# Patient Record
Sex: FEMALE | Race: OTHER | Employment: PART TIME | URBAN - METROPOLITAN AREA
[De-identification: names, ages, dates, MRNs, and addresses within clinical notes are randomized per-mention and may not be internally consistent; named-entity substitution may affect disease eponyms.]

---

## 2017-02-28 ENCOUNTER — OFFICE VISIT (OUTPATIENT)
Dept: INTERNAL MEDICINE CLINIC | Age: 32
End: 2017-02-28

## 2017-02-28 VITALS
TEMPERATURE: 99.4 F | DIASTOLIC BLOOD PRESSURE: 76 MMHG | HEIGHT: 63 IN | SYSTOLIC BLOOD PRESSURE: 124 MMHG | BODY MASS INDEX: 26.58 KG/M2 | RESPIRATION RATE: 18 BRPM | WEIGHT: 150 LBS | HEART RATE: 80 BPM

## 2017-02-28 DIAGNOSIS — J20.9 ACUTE BRONCHITIS, UNSPECIFIED ORGANISM: ICD-10-CM

## 2017-02-28 DIAGNOSIS — J01.90 ACUTE SINUSITIS, RECURRENCE NOT SPECIFIED, UNSPECIFIED LOCATION: Primary | ICD-10-CM

## 2017-02-28 DIAGNOSIS — J45.20 RAD (REACTIVE AIRWAY DISEASE), MILD INTERMITTENT, UNCOMPLICATED: ICD-10-CM

## 2017-02-28 PROCEDURE — 99213 OFFICE O/P EST LOW 20 MIN: CPT | Performed by: FAMILY MEDICINE

## 2017-02-28 RX ORDER — AZITHROMYCIN 250 MG/1
TABLET, FILM COATED ORAL
Qty: 1 PACKET | Refills: 0 | Status: SHIPPED | OUTPATIENT
Start: 2017-02-28 | End: 2017-03-10

## 2017-02-28 RX ORDER — PROMETHAZINE HYDROCHLORIDE AND CODEINE PHOSPHATE 6.25; 1 MG/5ML; MG/5ML
5 SYRUP ORAL 4 TIMES DAILY PRN
Qty: 140 ML | Refills: 0 | Status: SHIPPED | OUTPATIENT
Start: 2017-02-28 | End: 2017-03-07

## 2017-02-28 RX ORDER — PREDNISONE 20 MG/1
20 TABLET ORAL
Qty: 9 TABLET | Refills: 0 | Status: SHIPPED | OUTPATIENT
Start: 2017-02-28 | End: 2017-03-03

## 2017-02-28 ASSESSMENT — ENCOUNTER SYMPTOMS
COUGH: 1
DIARRHEA: 0
SORE THROAT: 0
WHEEZING: 1
EYE DISCHARGE: 0
VOMITING: 0
RHINORRHEA: 0
SINUS PRESSURE: 1
EYE REDNESS: 0
ABDOMINAL PAIN: 0
SHORTNESS OF BREATH: 0

## 2017-03-14 ENCOUNTER — TELEPHONE (OUTPATIENT)
Dept: INTERNAL MEDICINE CLINIC | Age: 32
End: 2017-03-14

## 2017-03-14 DIAGNOSIS — R30.0 DYSURIA: Primary | ICD-10-CM

## 2017-03-14 RX ORDER — CIPROFLOXACIN 500 MG/1
500 TABLET, FILM COATED ORAL 2 TIMES DAILY
Qty: 20 TABLET | Refills: 0 | Status: SHIPPED | OUTPATIENT
Start: 2017-03-14 | End: 2017-03-24

## 2017-03-15 ENCOUNTER — OFFICE VISIT (OUTPATIENT)
Dept: INTERNAL MEDICINE CLINIC | Age: 32
End: 2017-03-15

## 2017-03-15 VITALS
WEIGHT: 151 LBS | DIASTOLIC BLOOD PRESSURE: 68 MMHG | HEIGHT: 64 IN | SYSTOLIC BLOOD PRESSURE: 106 MMHG | HEART RATE: 96 BPM | TEMPERATURE: 98.4 F | BODY MASS INDEX: 25.78 KG/M2

## 2017-03-15 DIAGNOSIS — R11.0 NAUSEA: ICD-10-CM

## 2017-03-15 DIAGNOSIS — B02.9 HERPES ZOSTER WITHOUT COMPLICATION: ICD-10-CM

## 2017-03-15 DIAGNOSIS — B37.9 ANTIBIOTIC-INDUCED YEAST INFECTION: ICD-10-CM

## 2017-03-15 DIAGNOSIS — R30.9 PAINFUL URINATION: Primary | ICD-10-CM

## 2017-03-15 DIAGNOSIS — T36.95XA ANTIBIOTIC-INDUCED YEAST INFECTION: ICD-10-CM

## 2017-03-15 DIAGNOSIS — N39.0 URINARY TRACT INFECTION WITHOUT HEMATURIA, SITE UNSPECIFIED: ICD-10-CM

## 2017-03-15 DIAGNOSIS — R23.8 VESICULAR RASH: ICD-10-CM

## 2017-03-15 LAB
BILIRUBIN, POC: NORMAL
BLOOD URINE, POC: NORMAL
CLARITY, POC: CLEAR
COLOR, POC: YELLOW
GLUCOSE URINE, POC: NORMAL
KETONES, POC: NORMAL
LEUKOCYTE EST, POC: NORMAL
NITRITE, POC: NORMAL
PH, POC: 9
PROTEIN, POC: 30
SPECIFIC GRAVITY, POC: 1.01
UROBILINOGEN, POC: NORMAL

## 2017-03-15 PROCEDURE — 81002 URINALYSIS NONAUTO W/O SCOPE: CPT | Performed by: FAMILY MEDICINE

## 2017-03-15 PROCEDURE — 96372 THER/PROPH/DIAG INJ SC/IM: CPT | Performed by: FAMILY MEDICINE

## 2017-03-15 PROCEDURE — 99213 OFFICE O/P EST LOW 20 MIN: CPT | Performed by: FAMILY MEDICINE

## 2017-03-15 RX ORDER — FLUCONAZOLE 150 MG/1
150 TABLET ORAL ONCE
Qty: 1 TABLET | Refills: 0 | Status: SHIPPED | OUTPATIENT
Start: 2017-03-15 | End: 2017-03-15

## 2017-03-15 RX ORDER — CEFTRIAXONE 1 G/1
1 INJECTION, POWDER, FOR SOLUTION INTRAMUSCULAR; INTRAVENOUS ONCE
Status: COMPLETED | OUTPATIENT
Start: 2017-03-15 | End: 2017-03-15

## 2017-03-15 RX ORDER — VALACYCLOVIR HYDROCHLORIDE 1 G/1
1000 TABLET, FILM COATED ORAL 3 TIMES DAILY
Qty: 21 TABLET | Refills: 0 | Status: SHIPPED | OUTPATIENT
Start: 2017-03-15 | End: 2020-07-10

## 2017-03-15 RX ADMIN — CEFTRIAXONE 1 G: 1 INJECTION, POWDER, FOR SOLUTION INTRAMUSCULAR; INTRAVENOUS at 15:43

## 2017-03-15 ASSESSMENT — ENCOUNTER SYMPTOMS
WHEEZING: 0
COUGH: 0
SHORTNESS OF BREATH: 0
ABDOMINAL PAIN: 0
SORE THROAT: 0
DIARRHEA: 0
NAUSEA: 1
VOMITING: 0

## 2017-03-27 ENCOUNTER — TELEPHONE (OUTPATIENT)
Dept: INTERNAL MEDICINE CLINIC | Age: 32
End: 2017-03-27

## 2018-10-18 ENCOUNTER — APPOINTMENT (OUTPATIENT)
Dept: GENERAL RADIOLOGY | Age: 33
End: 2018-10-18
Payer: COMMERCIAL

## 2018-10-18 ENCOUNTER — APPOINTMENT (OUTPATIENT)
Dept: CT IMAGING | Age: 33
End: 2018-10-18
Payer: COMMERCIAL

## 2018-10-18 ENCOUNTER — HOSPITAL ENCOUNTER (EMERGENCY)
Age: 33
Discharge: HOME | End: 2018-10-18
Attending: EMERGENCY MEDICINE
Payer: COMMERCIAL

## 2018-10-18 VITALS
DIASTOLIC BLOOD PRESSURE: 67 MMHG | SYSTOLIC BLOOD PRESSURE: 111 MMHG | OXYGEN SATURATION: 100 % | HEART RATE: 70 BPM | TEMPERATURE: 98 F | RESPIRATION RATE: 16 BRPM

## 2018-10-18 DIAGNOSIS — R07.9 CHEST PAIN, UNSPECIFIED TYPE: Primary | ICD-10-CM

## 2018-10-18 LAB
A/G RATIO: 1.3 (ref 1.1–2.2)
ALBUMIN SERPL-MCNC: 4.3 G/DL (ref 3.4–5)
ALP BLD-CCNC: 79 U/L (ref 40–129)
ALT SERPL-CCNC: 84 U/L (ref 10–40)
ANION GAP SERPL CALCULATED.3IONS-SCNC: 15 MMOL/L (ref 3–16)
AST SERPL-CCNC: 55 U/L (ref 15–37)
BACTERIA: ABNORMAL /HPF
BASOPHILS ABSOLUTE: 0 K/UL (ref 0–0.2)
BASOPHILS RELATIVE PERCENT: 0.7 %
BILIRUB SERPL-MCNC: 0.3 MG/DL (ref 0–1)
BILIRUBIN URINE: NEGATIVE
BLOOD, URINE: ABNORMAL
BUN BLDV-MCNC: 12 MG/DL (ref 7–20)
CALCIUM SERPL-MCNC: 8.9 MG/DL (ref 8.3–10.6)
CHLORIDE BLD-SCNC: 108 MMOL/L (ref 99–110)
CLARITY: CLEAR
CO2: 20 MMOL/L (ref 21–32)
COLOR: YELLOW
CREAT SERPL-MCNC: 0.8 MG/DL (ref 0.6–1.1)
D DIMER: 631 NG/ML DDU (ref 0–229)
EOSINOPHILS ABSOLUTE: 0.1 K/UL (ref 0–0.6)
EOSINOPHILS RELATIVE PERCENT: 2.2 %
EPITHELIAL CELLS, UA: ABNORMAL /HPF
GFR AFRICAN AMERICAN: >60
GFR NON-AFRICAN AMERICAN: >60
GLOBULIN: 3.2 G/DL
GLUCOSE BLD-MCNC: 92 MG/DL (ref 70–99)
GLUCOSE URINE: NEGATIVE MG/DL
HCG QUALITATIVE: NEGATIVE
HCT VFR BLD CALC: 33.5 % (ref 36–48)
HEMOGLOBIN: 10.2 G/DL (ref 12–16)
KETONES, URINE: ABNORMAL MG/DL
LEUKOCYTE ESTERASE, URINE: NEGATIVE
LYMPHOCYTES ABSOLUTE: 1.2 K/UL (ref 1–5.1)
LYMPHOCYTES RELATIVE PERCENT: 21.7 %
MCH RBC QN AUTO: 22.7 PG (ref 26–34)
MCHC RBC AUTO-ENTMCNC: 30.4 G/DL (ref 31–36)
MCV RBC AUTO: 74.6 FL (ref 80–100)
MICROSCOPIC EXAMINATION: YES
MONOCYTES ABSOLUTE: 0.4 K/UL (ref 0–1.3)
MONOCYTES RELATIVE PERCENT: 8.4 %
MUCUS: ABNORMAL /LPF
NEUTROPHILS ABSOLUTE: 3.6 K/UL (ref 1.7–7.7)
NEUTROPHILS RELATIVE PERCENT: 67 %
NITRITE, URINE: NEGATIVE
PDW BLD-RTO: 16.4 % (ref 12.4–15.4)
PH UA: 6
PLATELET # BLD: 280 K/UL (ref 135–450)
PMV BLD AUTO: 8.3 FL (ref 5–10.5)
POTASSIUM SERPL-SCNC: 4 MMOL/L (ref 3.5–5.1)
PROTEIN UA: NEGATIVE MG/DL
RBC # BLD: 4.49 M/UL (ref 4–5.2)
RBC UA: ABNORMAL /HPF (ref 0–2)
SODIUM BLD-SCNC: 143 MMOL/L (ref 136–145)
SPECIFIC GRAVITY UA: 1.02
TOTAL PROTEIN: 7.5 G/DL (ref 6.4–8.2)
TROPONIN: <0.01 NG/ML
TROPONIN: <0.01 NG/ML
URINE TYPE: ABNORMAL
UROBILINOGEN, URINE: 0.2 E.U./DL
WBC # BLD: 5.3 K/UL (ref 4–11)
WBC UA: ABNORMAL /HPF (ref 0–5)

## 2018-10-18 PROCEDURE — 96374 THER/PROPH/DIAG INJ IV PUSH: CPT

## 2018-10-18 PROCEDURE — 80053 COMPREHEN METABOLIC PANEL: CPT

## 2018-10-18 PROCEDURE — 81001 URINALYSIS AUTO W/SCOPE: CPT

## 2018-10-18 PROCEDURE — 71260 CT THORAX DX C+: CPT

## 2018-10-18 PROCEDURE — 71046 X-RAY EXAM CHEST 2 VIEWS: CPT

## 2018-10-18 PROCEDURE — 6360000004 HC RX CONTRAST MEDICATION: Performed by: PHYSICIAN ASSISTANT

## 2018-10-18 PROCEDURE — 84703 CHORIONIC GONADOTROPIN ASSAY: CPT

## 2018-10-18 PROCEDURE — 84484 ASSAY OF TROPONIN QUANT: CPT

## 2018-10-18 PROCEDURE — 93005 ELECTROCARDIOGRAM TRACING: CPT | Performed by: EMERGENCY MEDICINE

## 2018-10-18 PROCEDURE — 96361 HYDRATE IV INFUSION ADD-ON: CPT

## 2018-10-18 PROCEDURE — 85379 FIBRIN DEGRADATION QUANT: CPT

## 2018-10-18 PROCEDURE — 36415 COLL VENOUS BLD VENIPUNCTURE: CPT

## 2018-10-18 PROCEDURE — 2580000003 HC RX 258: Performed by: PHYSICIAN ASSISTANT

## 2018-10-18 PROCEDURE — 99285 EMERGENCY DEPT VISIT HI MDM: CPT

## 2018-10-18 PROCEDURE — 6360000002 HC RX W HCPCS: Performed by: PHYSICIAN ASSISTANT

## 2018-10-18 PROCEDURE — 85025 COMPLETE CBC W/AUTO DIFF WBC: CPT

## 2018-10-18 RX ORDER — LORAZEPAM 2 MG/ML
0.5 INJECTION INTRAMUSCULAR ONCE
Status: COMPLETED | OUTPATIENT
Start: 2018-10-18 | End: 2018-10-18

## 2018-10-18 RX ORDER — 0.9 % SODIUM CHLORIDE 0.9 %
1000 INTRAVENOUS SOLUTION INTRAVENOUS ONCE
Status: COMPLETED | OUTPATIENT
Start: 2018-10-18 | End: 2018-10-18

## 2018-10-18 RX ADMIN — LORAZEPAM 0.5 MG: 2 INJECTION INTRAMUSCULAR; INTRAVENOUS at 12:03

## 2018-10-18 RX ADMIN — SODIUM CHLORIDE 1000 ML: 9 INJECTION, SOLUTION INTRAVENOUS at 12:03

## 2018-10-18 RX ADMIN — IOPAMIDOL 75 ML: 755 INJECTION, SOLUTION INTRAVENOUS at 13:49

## 2018-10-18 ASSESSMENT — PAIN DESCRIPTION - PAIN TYPE: TYPE: ACUTE PAIN

## 2018-10-18 ASSESSMENT — PAIN DESCRIPTION - LOCATION: LOCATION: CHEST

## 2018-10-18 NOTE — ED PROVIDER NOTES
Range    Sodium 143 136 - 145 mmol/L    Potassium 4.0 3.5 - 5.1 mmol/L    Chloride 108 99 - 110 mmol/L    CO2 20 (L) 21 - 32 mmol/L    Anion Gap 15 3 - 16    Glucose 92 70 - 99 mg/dL    BUN 12 7 - 20 mg/dL    CREATININE 0.8 0.6 - 1.1 mg/dL    GFR Non-African American >60 >60    GFR African American >60 >60    Calcium 8.9 8.3 - 10.6 mg/dL    Total Protein 7.5 6.4 - 8.2 g/dL    Alb 4.3 3.4 - 5.0 g/dL    Albumin/Globulin Ratio 1.3 1.1 - 2.2    Total Bilirubin 0.3 0.0 - 1.0 mg/dL    Alkaline Phosphatase 79 40 - 129 U/L    ALT 84 (H) 10 - 40 U/L    AST 55 (H) 15 - 37 U/L    Globulin 3.2 g/dL   Troponin   Result Value Ref Range    Troponin <0.01 <0.01 ng/mL   Urinalysis   Result Value Ref Range    Color, UA Yellow Straw/Yellow    Clarity, UA Clear Clear    Glucose, Ur Negative Negative mg/dL    Bilirubin Urine Negative Negative    Ketones, Urine TRACE (A) Negative mg/dL    Specific Gravity, UA 1.020 1.005 - 1.030    Blood, Urine TRACE-INTACT (A) Negative    pH, UA 6.0 5.0 - 8.0    Protein, UA Negative Negative mg/dL    Urobilinogen, Urine 0.2 <2.0 E.U./dL    Nitrite, Urine Negative Negative    Leukocyte Esterase, Urine Negative Negative    Microscopic Examination YES     Urine Type Not Specified    HCG Qualitative, Serum   Result Value Ref Range    hCG Qual Negative Detects HCG level >10 MIU/mL   D-dimer, quantitative   Result Value Ref Range    D-Dimer, Quant 631 (H) 0 - 229 ng/mL DDU   Microscopic Urinalysis   Result Value Ref Range    Mucus, UA Rare (A) /LPF    WBC, UA 0-2 None seen 0 - 5 /HPF    RBC, UA 0-2 0 - 2 /HPF    Epi Cells 0-2 /HPF    Bacteria, UA Rare (A) /HPF   Troponin   Result Value Ref Range    Troponin <0.01 <0.01 ng/mL   EKG 12 Lead   Result Value Ref Range    Ventricular Rate 90 BPM    Atrial Rate 90 BPM    P-R Interval 130 ms    QRS Duration 68 ms    Q-T Interval 352 ms    QTc Calculation (Bazett) 430 ms    P Axis 80 degrees    R Axis 81 degrees    T Axis 42 degrees    Diagnosis       Normal sinus

## 2018-10-19 LAB
EKG ATRIAL RATE: 90 BPM
EKG DIAGNOSIS: NORMAL
EKG P AXIS: 80 DEGREES
EKG P-R INTERVAL: 130 MS
EKG Q-T INTERVAL: 352 MS
EKG QRS DURATION: 68 MS
EKG QTC CALCULATION (BAZETT): 430 MS
EKG R AXIS: 81 DEGREES
EKG T AXIS: 42 DEGREES
EKG VENTRICULAR RATE: 90 BPM

## 2018-10-19 PROCEDURE — 93010 ELECTROCARDIOGRAM REPORT: CPT | Performed by: INTERNAL MEDICINE

## 2020-07-10 ENCOUNTER — APPOINTMENT (OUTPATIENT)
Dept: GENERAL RADIOLOGY | Age: 35
End: 2020-07-10
Payer: COMMERCIAL

## 2020-07-10 ENCOUNTER — HOSPITAL ENCOUNTER (EMERGENCY)
Age: 35
Discharge: HOME | End: 2020-07-10
Attending: EMERGENCY MEDICINE
Payer: COMMERCIAL

## 2020-07-10 VITALS
HEIGHT: 63 IN | RESPIRATION RATE: 13 BRPM | TEMPERATURE: 98.6 F | DIASTOLIC BLOOD PRESSURE: 73 MMHG | WEIGHT: 162 LBS | BODY MASS INDEX: 28.7 KG/M2 | OXYGEN SATURATION: 98 % | HEART RATE: 95 BPM | SYSTOLIC BLOOD PRESSURE: 110 MMHG

## 2020-07-10 LAB
A/G RATIO: 1.6 (ref 1.1–2.2)
ALBUMIN SERPL-MCNC: 4.5 G/DL (ref 3.4–5)
ALP BLD-CCNC: 62 U/L (ref 40–129)
ALT SERPL-CCNC: 16 U/L (ref 10–40)
ANION GAP SERPL CALCULATED.3IONS-SCNC: 13 MMOL/L (ref 3–16)
AST SERPL-CCNC: 16 U/L (ref 15–37)
BASOPHILS ABSOLUTE: 0 K/UL (ref 0–0.2)
BASOPHILS RELATIVE PERCENT: 0.5 %
BILIRUB SERPL-MCNC: 0.4 MG/DL (ref 0–1)
BUN BLDV-MCNC: 12 MG/DL (ref 7–20)
CALCIUM SERPL-MCNC: 9 MG/DL (ref 8.3–10.6)
CHLORIDE BLD-SCNC: 102 MMOL/L (ref 99–110)
CO2: 21 MMOL/L (ref 21–32)
CREAT SERPL-MCNC: 0.6 MG/DL (ref 0.6–1.1)
EKG ATRIAL RATE: 97 BPM
EKG DIAGNOSIS: NORMAL
EKG P AXIS: 47 DEGREES
EKG P-R INTERVAL: 134 MS
EKG Q-T INTERVAL: 334 MS
EKG QRS DURATION: 66 MS
EKG QTC CALCULATION (BAZETT): 424 MS
EKG R AXIS: 33 DEGREES
EKG T AXIS: 7 DEGREES
EKG VENTRICULAR RATE: 97 BPM
EOSINOPHILS ABSOLUTE: 0.2 K/UL (ref 0–0.6)
EOSINOPHILS RELATIVE PERCENT: 2.4 %
GFR AFRICAN AMERICAN: >60
GFR NON-AFRICAN AMERICAN: >60
GLOBULIN: 2.9 G/DL
GLUCOSE BLD-MCNC: 105 MG/DL (ref 70–99)
HCT VFR BLD CALC: 42.2 % (ref 36–48)
HEMOGLOBIN: 14.4 G/DL (ref 12–16)
LYMPHOCYTES ABSOLUTE: 2.3 K/UL (ref 1–5.1)
LYMPHOCYTES RELATIVE PERCENT: 30 %
MCH RBC QN AUTO: 29.9 PG (ref 26–34)
MCHC RBC AUTO-ENTMCNC: 34.2 G/DL (ref 31–36)
MCV RBC AUTO: 87.4 FL (ref 80–100)
MONOCYTES ABSOLUTE: 0.6 K/UL (ref 0–1.3)
MONOCYTES RELATIVE PERCENT: 7.1 %
NEUTROPHILS ABSOLUTE: 4.7 K/UL (ref 1.7–7.7)
NEUTROPHILS RELATIVE PERCENT: 60 %
PDW BLD-RTO: 13.3 % (ref 12.4–15.4)
PLATELET # BLD: 227 K/UL (ref 135–450)
PMV BLD AUTO: 9.4 FL (ref 5–10.5)
POTASSIUM SERPL-SCNC: 4 MMOL/L (ref 3.5–5.1)
RBC # BLD: 4.83 M/UL (ref 4–5.2)
SODIUM BLD-SCNC: 136 MMOL/L (ref 136–145)
TOTAL PROTEIN: 7.4 G/DL (ref 6.4–8.2)
TROPONIN: <0.01 NG/ML
WBC # BLD: 7.8 K/UL (ref 4–11)

## 2020-07-10 PROCEDURE — 99285 EMERGENCY DEPT VISIT HI MDM: CPT

## 2020-07-10 PROCEDURE — 71046 X-RAY EXAM CHEST 2 VIEWS: CPT

## 2020-07-10 PROCEDURE — 93010 ELECTROCARDIOGRAM REPORT: CPT | Performed by: INTERNAL MEDICINE

## 2020-07-10 PROCEDURE — 80053 COMPREHEN METABOLIC PANEL: CPT

## 2020-07-10 PROCEDURE — 85025 COMPLETE CBC W/AUTO DIFF WBC: CPT

## 2020-07-10 PROCEDURE — 93005 ELECTROCARDIOGRAM TRACING: CPT | Performed by: EMERGENCY MEDICINE

## 2020-07-10 PROCEDURE — 84484 ASSAY OF TROPONIN QUANT: CPT

## 2020-07-10 ASSESSMENT — PAIN DESCRIPTION - LOCATION: LOCATION: CHEST

## 2020-07-10 ASSESSMENT — PAIN DESCRIPTION - ORIENTATION: ORIENTATION: MID

## 2020-07-10 ASSESSMENT — PAIN SCALES - GENERAL: PAINLEVEL_OUTOF10: 3

## 2020-07-10 ASSESSMENT — PAIN DESCRIPTION - PAIN TYPE: TYPE: ACUTE PAIN

## 2020-07-10 ASSESSMENT — PAIN DESCRIPTION - DESCRIPTORS: DESCRIPTORS: PRESSURE

## 2020-07-10 NOTE — ED PROVIDER NOTES
CHIEF COMPLAINT  Chest Pain (sudden onset mid sternal chest pain )      HISTORY OF PRESENT ILLNESS  Pavan Dodd is a 30 y.o. female with a history of acne, dysmenorrhea, anxiety, and reported SVT who presents to the ED complaining of chest pain. Patient reports that she has had similar symptoms in the past which were reportedly documented as SVT by patient. Patient states that shortly prior to arrival she began developing shortness of breath and chest heaviness. She states that she took her pulse which was running approximately 160. Patient denies fevers, chills, sweats. No cough, or congestion. No lower extremity swelling or edema. Patient reports that symptoms have nearly resolved prior to arrival.  Pulse upon arrival is 99 and regular. .   No other complaints, modifying factors or associated symptoms. I have reviewed the following from the nursing documentation. Past Medical History:   Diagnosis Date    Acne     Adjustment reaction with anxiety and depression     PMDD    Allergic rhinitis : IgE labs    dustmite, mold, walnut tree    Dysmenorrhea     Latex allergy     RAD (reactive airway disease)     : Vit.  D Insufficiency     Past Surgical History:   Procedure Laterality Date     SECTION  02/15/2016     SECTION, LOW TRANSVERSE  3/1/14    PRE-MALIGNANT / BENIGN SKIN LESION EXCISION  5321,5843    Giant Nevus removed, scar tissue revised    TUBAL LIGATION Bilateral 02/15/2016    WISDOM TOOTH EXTRACTION Bilateral age 14     Family History   Adopted: Yes     Social History     Socioeconomic History    Marital status:      Spouse name: Ashli Malin Number of children: 2    Years of education: College    Highest education level: Not on file   Occupational History    Occupation: Teacher: Gymnastics   Social Needs    Financial resource strain: Not on file    Food insecurity     Worry: Not on file     Inability: Not on file   [employer] needs Medical: Not on file     Non-medical: Not on file   Tobacco Use    Smoking status: Never Smoker    Smokeless tobacco: Never Used   Substance and Sexual Activity    Alcohol use: No     Alcohol/week: 0.0 standard drinks    Drug use: No    Sexual activity: Yes     Partners: Male     Birth control/protection: Surgical     Comment: tubal ligation   Lifestyle    Physical activity     Days per week: Not on file     Minutes per session: Not on file    Stress: Not on file   Relationships    Social connections     Talks on phone: Not on file     Gets together: Not on file     Attends Adventist service: Not on file     Active member of club or organization: Not on file     Attends meetings of clubs or organizations: Not on file     Relationship status: Not on file    Intimate partner violence     Fear of current or ex partner: Not on file     Emotionally abused: Not on file     Physically abused: Not on file     Forced sexual activity: Not on file   Other Topics Concern    Not on file   Social History Narrative    Not on file     No current facility-administered medications for this encounter. No current outpatient medications on file. Allergies   Allergen Reactions    Latex Hives, Shortness Of Breath and Itching    Sulfamethoxazole-Trimethoprim Diarrhea    Sulfamethoxazole-Trimethoprim Nausea Only       REVIEW OF SYSTEMS  10 systems reviewed, pertinent positives per HPI otherwise noted to be negative. PHYSICAL EXAM  /73   Pulse 95   Temp 98.6 °F (37 °C) (Oral)   Resp 13   Ht 5' 3\" (1.6 m)   Wt 162 lb (73.5 kg)   LMP 02/28/2017   SpO2 98%   BMI 28.70 kg/m²   GENERAL APPEARANCE: Awake and alert. Cooperative. No acute distress. HEAD: Normocephalic. Atraumatic. EYES: PERRL. EOM's grossly intact. ENT: Mucous membranes are moist.   NECK: Supple, trachea midline. HEART: RRR. Normal S1S2, no rubs, gallops, or murmurs noted  LUNGS: Respirations unlabored. CTAB. Good air exchange.  No wheezes, rales, or rhonchi. Speaking comfortably in full sentences. ABDOMEN: Soft. Non-distended. Non-tender. No guarding or rebound. Normal bowel sounds. EXTREMITIES: No peripheral edema. MAEE. No acute deformities. SKIN: Warm and dry. No acute rashes. NEUROLOGICAL: Alert and oriented X 3. CN II-XII intact. No gross facial drooping. Strength 5/5, sensation intact. Normal coordination. No pronator drift. Gait normal.   PSYCHIATRIC: Normal mood and affect. LABS  I have reviewed all labs for this visit.    Results for orders placed or performed during the hospital encounter of 07/10/20   CBC auto differential   Result Value Ref Range    WBC 7.8 4.0 - 11.0 K/uL    RBC 4.83 4.00 - 5.20 M/uL    Hemoglobin 14.4 12.0 - 16.0 g/dL    Hematocrit 42.2 36.0 - 48.0 %    MCV 87.4 80.0 - 100.0 fL    MCH 29.9 26.0 - 34.0 pg    MCHC 34.2 31.0 - 36.0 g/dL    RDW 13.3 12.4 - 15.4 %    Platelets 277 427 - 293 K/uL    MPV 9.4 5.0 - 10.5 fL    Neutrophils % 60.0 %    Lymphocytes % 30.0 %    Monocytes % 7.1 %    Eosinophils % 2.4 %    Basophils % 0.5 %    Neutrophils Absolute 4.7 1.7 - 7.7 K/uL    Lymphocytes Absolute 2.3 1.0 - 5.1 K/uL    Monocytes Absolute 0.6 0.0 - 1.3 K/uL    Eosinophils Absolute 0.2 0.0 - 0.6 K/uL    Basophils Absolute 0.0 0.0 - 0.2 K/uL   Comprehensive metabolic panel   Result Value Ref Range    Sodium 136 136 - 145 mmol/L    Potassium 4.0 3.5 - 5.1 mmol/L    Chloride 102 99 - 110 mmol/L    CO2 21 21 - 32 mmol/L    Anion Gap 13 3 - 16    Glucose 105 (H) 70 - 99 mg/dL    BUN 12 7 - 20 mg/dL    CREATININE 0.6 0.6 - 1.1 mg/dL    GFR Non-African American >60 >60    GFR African American >60 >60    Calcium 9.0 8.3 - 10.6 mg/dL    Total Protein 7.4 6.4 - 8.2 g/dL    Alb 4.5 3.4 - 5.0 g/dL    Albumin/Globulin Ratio 1.6 1.1 - 2.2    Total Bilirubin 0.4 0.0 - 1.0 mg/dL    Alkaline Phosphatase 62 40 - 129 U/L    ALT 16 10 - 40 U/L    AST 16 15 - 37 U/L    Globulin 2.9 g/dL   Troponin   Result Value Ref Range Troponin <0.01 <0.01 ng/mL   EKG 12 Lead   Result Value Ref Range    Ventricular Rate 97 BPM    Atrial Rate 97 BPM    P-R Interval 134 ms    QRS Duration 66 ms    Q-T Interval 334 ms    QTc Calculation (Bazett) 424 ms    P Axis 47 degrees    R Axis 33 degrees    T Axis 7 degrees    Diagnosis       Normal sinus rhythmNormal ECGWhen compared with ECG of 18-OCT-2018 11:14,No significant change was found       EKG  The Ekg interpreted by myself  Normal sinus rhythm with a rate of 97. Axis is   Normal  QTc is  normal  Intervals and Durations are unremarkable. No specific ST-T wave changes appreciated. No evidence of acute ischemia. No change compared to previous EKG on 10/18/2018    Cardiac Monitoring: No ectopy. Normal rate. RADIOLOGY  X-RAYS:  I have reviewed radiologic plain film image(s). ALL OTHER NON-PLAIN FILM IMAGES SUCH AS CT, ULTRASOUND AND MRI HAVE BEEN READ BY THE RADIOLOGIST. XR CHEST STANDARD (2 VW)   Final Result   Normal chest.  No change from the prior study. Rechecks: Physical assessment performed. 1118: Patient remains asymptomatic at this time. ED COURSE/MDM  Patient seen and evaluated. Old records reviewed. Labs and imaging reviewed and results discussed with patient. Patient was stable throughout her stay in the emergency department in spite of reported pulse of 160 at home. Patient's labs, imaging, and EKG are stable. I recommended close outpatient follow-up with her previously established cardiologist for Holter monitoring. Plan of care discussed with patient and family. Patient and family in agreement with plan. Patient was given scripts for the following medications. I counseled patient how to take these medications. Discharge Medication List as of 7/10/2020 12:04 PM          CLINICAL IMPRESSION  1. Palpitations    2.  Chest pain, unspecified type        Blood pressure 110/73, pulse 95, temperature 98.6 °F (37 °C), temperature source Oral, resp. rate 13, height 5' 3\" (1.6 m), weight 162 lb (73.5 kg), last menstrual period 02/28/2017, SpO2 98 %, not currently breastfeeding. Marko Akins was discharged to home in stable condition.          Jose Alejandro Watson,   07/10/20 1325

## 2022-04-04 ENCOUNTER — APPOINTMENT (OUTPATIENT)
Dept: GENERAL RADIOLOGY | Age: 37
End: 2022-04-04
Payer: COMMERCIAL

## 2022-04-04 ENCOUNTER — HOSPITAL ENCOUNTER (EMERGENCY)
Age: 37
Discharge: HOME | End: 2022-04-04
Attending: EMERGENCY MEDICINE
Payer: COMMERCIAL

## 2022-04-04 VITALS
WEIGHT: 170 LBS | HEIGHT: 63 IN | OXYGEN SATURATION: 100 % | SYSTOLIC BLOOD PRESSURE: 114 MMHG | TEMPERATURE: 98.2 F | RESPIRATION RATE: 16 BRPM | HEART RATE: 97 BPM | DIASTOLIC BLOOD PRESSURE: 81 MMHG | BODY MASS INDEX: 30.12 KG/M2

## 2022-04-04 DIAGNOSIS — R00.2 PALPITATIONS: ICD-10-CM

## 2022-04-04 DIAGNOSIS — R07.9 CHEST PAIN, UNSPECIFIED TYPE: Primary | ICD-10-CM

## 2022-04-04 LAB
A/G RATIO: 1.8 (ref 1.1–2.2)
ALBUMIN SERPL-MCNC: 4.8 G/DL (ref 3.4–5)
ALP BLD-CCNC: 68 U/L (ref 40–129)
ALT SERPL-CCNC: 27 U/L (ref 10–40)
AMORPHOUS: ABNORMAL /HPF
ANION GAP SERPL CALCULATED.3IONS-SCNC: 14 MMOL/L (ref 3–16)
AST SERPL-CCNC: 22 U/L (ref 15–37)
BACTERIA: ABNORMAL /HPF
BASOPHILS ABSOLUTE: 0 K/UL (ref 0–0.2)
BASOPHILS RELATIVE PERCENT: 0.2 %
BILIRUB SERPL-MCNC: 0.3 MG/DL (ref 0–1)
BILIRUBIN URINE: NEGATIVE
BLOOD, URINE: ABNORMAL
BUN BLDV-MCNC: 18 MG/DL (ref 7–20)
CALCIUM SERPL-MCNC: 8.9 MG/DL (ref 8.3–10.6)
CHLORIDE BLD-SCNC: 102 MMOL/L (ref 99–110)
CLARITY: CLEAR
CO2: 22 MMOL/L (ref 21–32)
COLOR: YELLOW
CREAT SERPL-MCNC: 0.9 MG/DL (ref 0.6–1.1)
EOSINOPHILS ABSOLUTE: 0 K/UL (ref 0–0.6)
EOSINOPHILS RELATIVE PERCENT: 0.7 %
EPITHELIAL CELLS, UA: ABNORMAL /HPF (ref 0–5)
GFR AFRICAN AMERICAN: >60
GFR NON-AFRICAN AMERICAN: >60
GLUCOSE BLD-MCNC: 87 MG/DL (ref 70–99)
GLUCOSE URINE: NEGATIVE MG/DL
GONADOTROPIN, CHORIONIC (HCG) QUANT: <5 MIU/ML
HCT VFR BLD CALC: 39.9 % (ref 36–48)
HEMOGLOBIN: 13.6 G/DL (ref 12–16)
KETONES, URINE: ABNORMAL MG/DL
LEUKOCYTE ESTERASE, URINE: ABNORMAL
LYMPHOCYTES ABSOLUTE: 1.5 K/UL (ref 1–5.1)
LYMPHOCYTES RELATIVE PERCENT: 26.2 %
MCH RBC QN AUTO: 29.2 PG (ref 26–34)
MCHC RBC AUTO-ENTMCNC: 34 G/DL (ref 31–36)
MCV RBC AUTO: 86.1 FL (ref 80–100)
MICROSCOPIC EXAMINATION: YES
MONOCYTES ABSOLUTE: 0.7 K/UL (ref 0–1.3)
MONOCYTES RELATIVE PERCENT: 13 %
MUCUS: ABNORMAL /LPF
NEUTROPHILS ABSOLUTE: 3.4 K/UL (ref 1.7–7.7)
NEUTROPHILS RELATIVE PERCENT: 59.9 %
NITRITE, URINE: NEGATIVE
PDW BLD-RTO: 12.8 % (ref 12.4–15.4)
PH UA: 6 (ref 5–8)
PLATELET # BLD: 231 K/UL (ref 135–450)
PMV BLD AUTO: 8.6 FL (ref 5–10.5)
POTASSIUM SERPL-SCNC: 4 MMOL/L (ref 3.5–5.1)
PROTEIN UA: NEGATIVE MG/DL
RBC # BLD: 4.63 M/UL (ref 4–5.2)
RBC UA: ABNORMAL /HPF (ref 0–4)
SODIUM BLD-SCNC: 138 MMOL/L (ref 136–145)
SPECIFIC GRAVITY UA: 1.01 (ref 1–1.03)
TOTAL PROTEIN: 7.4 G/DL (ref 6.4–8.2)
TROPONIN: <0.01 NG/ML
URINE TYPE: ABNORMAL
UROBILINOGEN, URINE: 0.2 E.U./DL
WBC # BLD: 5.7 K/UL (ref 4–11)
WBC UA: ABNORMAL /HPF (ref 0–5)

## 2022-04-04 PROCEDURE — 2580000003 HC RX 258: Performed by: PHYSICIAN ASSISTANT

## 2022-04-04 PROCEDURE — 84484 ASSAY OF TROPONIN QUANT: CPT

## 2022-04-04 PROCEDURE — 84702 CHORIONIC GONADOTROPIN TEST: CPT

## 2022-04-04 PROCEDURE — 81001 URINALYSIS AUTO W/SCOPE: CPT

## 2022-04-04 PROCEDURE — 80053 COMPREHEN METABOLIC PANEL: CPT

## 2022-04-04 PROCEDURE — 93005 ELECTROCARDIOGRAM TRACING: CPT | Performed by: EMERGENCY MEDICINE

## 2022-04-04 PROCEDURE — 71045 X-RAY EXAM CHEST 1 VIEW: CPT

## 2022-04-04 PROCEDURE — 85025 COMPLETE CBC W/AUTO DIFF WBC: CPT

## 2022-04-04 PROCEDURE — 6360000002 HC RX W HCPCS: Performed by: PHYSICIAN ASSISTANT

## 2022-04-04 PROCEDURE — 96374 THER/PROPH/DIAG INJ IV PUSH: CPT

## 2022-04-04 PROCEDURE — 99284 EMERGENCY DEPT VISIT MOD MDM: CPT

## 2022-04-04 RX ORDER — 0.9 % SODIUM CHLORIDE 0.9 %
1000 INTRAVENOUS SOLUTION INTRAVENOUS ONCE
Status: COMPLETED | OUTPATIENT
Start: 2022-04-04 | End: 2022-04-04

## 2022-04-04 RX ORDER — LORAZEPAM 2 MG/ML
1 INJECTION INTRAMUSCULAR ONCE
Status: COMPLETED | OUTPATIENT
Start: 2022-04-04 | End: 2022-04-04

## 2022-04-04 RX ADMIN — LORAZEPAM 1 MG: 2 INJECTION INTRAMUSCULAR; INTRAVENOUS at 20:27

## 2022-04-04 RX ADMIN — SODIUM CHLORIDE 1000 ML: 9 INJECTION, SOLUTION INTRAVENOUS at 20:23

## 2022-04-04 ASSESSMENT — PAIN SCALES - GENERAL
PAINLEVEL_OUTOF10: 2
PAINLEVEL_OUTOF10: 3

## 2022-04-04 ASSESSMENT — PAIN DESCRIPTION - LOCATION: LOCATION: CHEST

## 2022-04-04 ASSESSMENT — PAIN DESCRIPTION - DESCRIPTORS: DESCRIPTORS: CONSTANT

## 2022-04-04 ASSESSMENT — PAIN DESCRIPTION - ORIENTATION: ORIENTATION: LEFT;UPPER

## 2022-04-04 NOTE — ED PROVIDER NOTES
Spouse name: Criselda Dobson Number of children: 2    Years of education: College    Highest education level: Not on file   Occupational History    Occupation: Teacher: Gymnastics   Tobacco Use    Smoking status: Never Smoker    Smokeless tobacco: Never Used   Substance and Sexual Activity    Alcohol use: No     Alcohol/week: 0.0 standard drinks    Drug use: Yes     Types: Marijuana Fronamrit Marquez)    Sexual activity: Yes     Partners: Male     Birth control/protection: Surgical     Comment: tubal ligation   Other Topics Concern    Not on file   Social History Narrative    Not on file     Social Determinants of Health     Financial Resource Strain:     Difficulty of Paying Living Expenses: Not on file   Food Insecurity:     Worried About Running Out of Food in the Last Year: Not on file    Jono of Food in the Last Year: Not on file   Transportation Needs:     Lack of Transportation (Medical): Not on file    Lack of Transportation (Non-Medical): Not on file   Physical Activity:     Days of Exercise per Week: Not on file    Minutes of Exercise per Session: Not on file   Stress:     Feeling of Stress : Not on file   Social Connections:     Frequency of Communication with Friends and Family: Not on file    Frequency of Social Gatherings with Friends and Family: Not on file    Attends Shinto Services: Not on file    Active Member of [address] [place] [address] [employer] or Organizations: Not on file    Attends Club or Organization Meetings: Not on file    Marital Status: Not on file   Intimate Partner Violence:     Fear of Current or Ex-Partner: Not on file    Emotionally Abused: Not on file    Physically Abused: Not on file    Sexually Abused: Not on file   Housing Stability:     Unable to Pay for Housing in the Last Year: Not on file    Number of [place] in the Last Year: Not on file    Unstable Housing in the Last Year: Not on file     No current facility-administered medications for this encounter.      No current outpatient medications on file. Allergies   Allergen Reactions    Latex Hives, Shortness Of Breath and Itching    Sulfamethoxazole-Trimethoprim Diarrhea    Sulfamethoxazole-Trimethoprim Nausea Only       REVIEW OF SYSTEMS  10 systems reviewed, pertinent positives per HPI otherwise noted to be negative    PHYSICAL EXAM  /82   Pulse 109   Temp 98.2 °F (36.8 °C) (Oral)   Resp 20   Ht 5' 3\" (1.6 m)   Wt 170 lb (77.1 kg)   LMP 02/28/2017   SpO2 97%   BMI 30.11 kg/m²   GENERAL APPEARANCE: Awake and alert. Cooperative. No acute distress. HEAD: Normocephalic. Atraumatic. EYES: PERRL. EOM's grossly intact. ENT: Mucous membranes are moist.   NECK: Supple. No midline bony tenderness. No JVD. HEART: RRR. No murmurs. No chest wall tenderness. LUNGS: Respirations unlabored. CTAB. Good air exchange. Speaking comfortably in full sentences. No wheezing, rhonchi, rales. ABDOMEN: Soft. Non-distended. Non-tender. No guarding or rebound. No midline pulsatile mass. EXTREMITIES: No peripheral edema. No unilateral calf pain, redness or swelling. Moves all extremities equally. All extremities neurovascularly intact. SKIN: Warm and dry. No acute rashes. NEUROLOGICAL: Alert and oriented. CN's 2-12 intact. No gross facial drooping. Strength 5/5, sensation intact. PSYCHIATRIC: Normal mood and affect. RADIOLOGY  XR CHEST PORTABLE    Result Date: 4/4/2022  EXAMINATION: ONE XRAY VIEW OF THE CHEST 4/4/2022 5:52 pm COMPARISON: None. HISTORY: ORDERING SYSTEM PROVIDED HISTORY:  TECHNOLOGIST PROVIDED HISTORY: Reason for exam:->cp Reason for Exam: chest pain FINDINGS: The heart is normal.  The pulmonary vessels are normal.  No consolidation or effusion is seen. The bones are intact. Stable chest with no acute abnormality seen. ED COURSE  Pain control was not required while here in the emergency department. Triage vitals with slight tachycardia pulse rate 109.   CBC without leukocytosis or anemia. Troponin less than 0.01. Quantitative hCG negative. Urinalysis with trace ketonuria. No evidence for infectious process. Stable portable chest x-ray. EKG is consistent with a sinus tachycardia at a rate of 107. Remainder vitals stable. Patient with positive orthostatics. Given a 1 L IV fluid bolus. Patient was in our internal waiting room and began feeling dizzy and anxious. Given dose of Ativan. On reassessment patient feeling better and lightheadedness has improved. Goes on to say that heart rate at home was in 170s per her watch. Does have cardiology follow-up and has had similar episodes in the past with negative Holter monitor use. States that she has been doing fairly well over the past year. I will be discharged home at this time with return precautions and recommendations for close and continued outpatient follow-up. We have discussed return precautions otherwise and patient is in agreement and comfortable at discharge. A discussion was had with Ms. Cerda regarding shortness of breath and chest discomfort, elevated heart rate, ED findings and recommendations for follow-up. Risk management discussed and shared decision making had with patient and/or surrogate. All questions were answered. Patient will follow up with PCP and cardiologist within 2 to 3 days for further evaluation/treatment. All questions answered. Patient will return to ED for new/worsening symptoms. Patient was informed to continue home medications as prescribed.     MDM  Results for orders placed or performed during the hospital encounter of 04/04/22   CBC with Auto Differential   Result Value Ref Range    WBC 5.7 4.0 - 11.0 K/uL    RBC 4.63 4.00 - 5.20 M/uL    Hemoglobin 13.6 12.0 - 16.0 g/dL    Hematocrit 39.9 36.0 - 48.0 %    MCV 86.1 80.0 - 100.0 fL    MCH 29.2 26.0 - 34.0 pg    MCHC 34.0 31.0 - 36.0 g/dL    RDW 12.8 12.4 - 15.4 %    Platelets 335 282 - 596 K/uL    MPV 8.6 5.0 - 10.5 fL    Neutrophils % 59.9 %    Lymphocytes % 26.2 %    Monocytes % 13.0 %    Eosinophils % 0.7 %    Basophils % 0.2 %    Neutrophils Absolute 3.4 1.7 - 7.7 K/uL    Lymphocytes Absolute 1.5 1.0 - 5.1 K/uL    Monocytes Absolute 0.7 0.0 - 1.3 K/uL    Eosinophils Absolute 0.0 0.0 - 0.6 K/uL    Basophils Absolute 0.0 0.0 - 0.2 K/uL   Comprehensive Metabolic Panel   Result Value Ref Range    Sodium 138 136 - 145 mmol/L    Potassium 4.0 3.5 - 5.1 mmol/L    Chloride 102 99 - 110 mmol/L    CO2 22 21 - 32 mmol/L    Anion Gap 14 3 - 16    Glucose 87 70 - 99 mg/dL    BUN 18 7 - 20 mg/dL    CREATININE 0.9 0.6 - 1.1 mg/dL    GFR Non-African American >60 >60    GFR African American >60 >60    Calcium 8.9 8.3 - 10.6 mg/dL    Total Protein 7.4 6.4 - 8.2 g/dL    Albumin 4.8 3.4 - 5.0 g/dL    Albumin/Globulin Ratio 1.8 1.1 - 2.2    Total Bilirubin 0.3 0.0 - 1.0 mg/dL    Alkaline Phosphatase 68 40 - 129 U/L    ALT 27 10 - 40 U/L    AST 22 15 - 37 U/L   Troponin   Result Value Ref Range    Troponin <0.01 <0.01 ng/mL   Urinalysis   Result Value Ref Range    Color, UA Yellow Straw/Yellow    Clarity, UA Clear Clear    Glucose, Ur Negative Negative mg/dL    Bilirubin Urine Negative Negative    Ketones, Urine TRACE (A) Negative mg/dL    Specific Gravity, UA 1.010 1.005 - 1.030    Blood, Urine TRACE-INTACT (A) Negative    pH, UA 6.0 5.0 - 8.0    Protein, UA Negative Negative mg/dL    Urobilinogen, Urine 0.2 <2.0 E.U./dL    Nitrite, Urine Negative Negative    Leukocyte Esterase, Urine SMALL (A) Negative    Microscopic Examination YES     Urine Type NotGiven    hCG, quantitative, pregnancy   Result Value Ref Range    hCG Quant <5.0 <5.0 mIU/mL   Microscopic Urinalysis   Result Value Ref Range    Mucus, UA 1+ (A) None Seen /LPF    WBC, UA 3-5 0 - 5 /HPF    RBC, UA 0-2 0 - 4 /HPF    Epithelial Cells, UA 6-10 (A) 0 - 5 /HPF    Bacteria, UA 1+ (A) None Seen /HPF    Amorphous, UA 1+ /HPF   EKG 12 Lead   Result Value Ref Range    Ventricular Rate 107 BPM    Atrial Rate 107 BPM    P-R Interval 130 ms    QRS Duration 70 ms    Q-T Interval 336 ms    QTc Calculation (Bazett) 448 ms    P Axis 63 degrees    R Axis 62 degrees    T Axis 2 degrees    Diagnosis       Sinus tachycardiaOtherwise normal ECGWhen compared with ECG of 10-JUL-2020 09:41,No significant change was found     I estimate there is LOW risk for ACUTE CORONARY SYNDROME, INTRACRANIAL HEMORRHAGE, MALIGNANT DYSRHYTHMIA or HYPERTENSION, PULMONARY EMBOLISM, SEPSIS, SUBARACHNOID HEMORRHAGE, SUBDURAL HEMATOMA, STROKE, or THORACIC AORTIC DISSECTION, thus I consider the discharge disposition reasonable. Uli Torres and I have discussed the diagnosis and risks, and we agree with discharging home to follow-up with their primary doctor. We also discussed returning to the Emergency Department immediately if new or worsening symptoms occur. We have discussed the symptoms which are most concerning (e.g., bloody sputum, fever, worsening pain or shortness of breath, vomiting, weakness) that necessitate immediate return. Final Impression  1. Chest pain, unspecified type    2. Palpitations      Blood pressure 114/81, pulse 97, temperature 98.2 °F (36.8 °C), temperature source Oral, resp. rate 16, height 5' 3\" (1.6 m), weight 170 lb (77.1 kg), last menstrual period 02/28/2017, SpO2 100 %, not currently breastfeeding. DISPOSITION  Patient was discharged to home in good condition.           Chapo Leo  04/05/22 [id number]

## 2022-04-05 LAB
EKG ATRIAL RATE: 107 BPM
EKG DIAGNOSIS: NORMAL
EKG P AXIS: 63 DEGREES
EKG P-R INTERVAL: 130 MS
EKG Q-T INTERVAL: 336 MS
EKG QRS DURATION: 70 MS
EKG QTC CALCULATION (BAZETT): 448 MS
EKG R AXIS: 62 DEGREES
EKG T AXIS: 2 DEGREES
EKG VENTRICULAR RATE: 107 BPM

## 2022-04-05 PROCEDURE — 93010 ELECTROCARDIOGRAM REPORT: CPT | Performed by: INTERNAL MEDICINE

## 2025-04-07 ENCOUNTER — APPOINTMENT (OUTPATIENT)
Dept: GENERAL RADIOLOGY | Age: 40
End: 2025-04-07
Payer: COMMERCIAL

## 2025-04-07 ENCOUNTER — APPOINTMENT (OUTPATIENT)
Dept: CT IMAGING | Age: 40
End: 2025-04-07
Attending: EMERGENCY MEDICINE
Payer: COMMERCIAL

## 2025-04-07 ENCOUNTER — HOSPITAL ENCOUNTER (OUTPATIENT)
Age: 40
Setting detail: OBSERVATION
Discharge: HOME | End: 2025-04-09
Attending: EMERGENCY MEDICINE | Admitting: HOSPITALIST
Payer: COMMERCIAL

## 2025-04-07 DIAGNOSIS — R79.89 ELEVATED TROPONIN: ICD-10-CM

## 2025-04-07 DIAGNOSIS — R00.0 SINUS TACHYCARDIA: ICD-10-CM

## 2025-04-07 DIAGNOSIS — R55 NEAR SYNCOPE: ICD-10-CM

## 2025-04-07 DIAGNOSIS — R07.9 CHEST PAIN, UNSPECIFIED TYPE: Primary | ICD-10-CM

## 2025-04-07 LAB
ALBUMIN SERPL-MCNC: 4.5 G/DL (ref 3.4–5)
ALBUMIN/GLOB SERPL: 1.6 {RATIO} (ref 1.1–2.2)
ALP SERPL-CCNC: 70 U/L (ref 40–129)
ALT SERPL-CCNC: 18 U/L (ref 10–40)
AMPHETAMINES UR QL SCN>1000 NG/ML: ABNORMAL
ANION GAP SERPL CALCULATED.3IONS-SCNC: 13 MMOL/L (ref 3–16)
AST SERPL-CCNC: 19 U/L (ref 15–37)
B-HCG SERPL EIA 3RD IS-ACNC: <5 MIU/ML
BARBITURATES UR QL SCN>200 NG/ML: ABNORMAL
BASOPHILS # BLD: 0.1 K/UL (ref 0–0.2)
BASOPHILS NFR BLD: 0.8 %
BENZODIAZ UR QL SCN>200 NG/ML: ABNORMAL
BILIRUB SERPL-MCNC: <0.2 MG/DL (ref 0–1)
BUN SERPL-MCNC: 13 MG/DL (ref 7–20)
CALCIUM SERPL-MCNC: 9.3 MG/DL (ref 8.3–10.6)
CANNABINOIDS UR QL SCN>50 NG/ML: POSITIVE
CHLORIDE SERPL-SCNC: 106 MMOL/L (ref 99–110)
CO2 SERPL-SCNC: 25 MMOL/L (ref 21–32)
COCAINE UR QL SCN: ABNORMAL
CREAT SERPL-MCNC: 0.8 MG/DL (ref 0.6–1.1)
D-DIMER QUANTITATIVE: <0.27 UG/ML FEU (ref 0–0.6)
DEPRECATED RDW RBC AUTO: 12.9 % (ref 12.4–15.4)
DRUG SCREEN COMMENT UR-IMP: ABNORMAL
EKG ATRIAL RATE: 95 BPM
EKG DIAGNOSIS: NORMAL
EKG P AXIS: 35 DEGREES
EKG P-R INTERVAL: 130 MS
EKG Q-T INTERVAL: 338 MS
EKG QRS DURATION: 74 MS
EKG QTC CALCULATION (BAZETT): 424 MS
EKG R AXIS: 43 DEGREES
EKG T AXIS: 5 DEGREES
EKG VENTRICULAR RATE: 95 BPM
EOSINOPHIL # BLD: 0.2 K/UL (ref 0–0.6)
EOSINOPHIL NFR BLD: 2.7 %
FENTANYL SCREEN, URINE: ABNORMAL
GFR SERPLBLD CREATININE-BSD FMLA CKD-EPI: >90 ML/MIN/{1.73_M2}
GLUCOSE SERPL-MCNC: 83 MG/DL (ref 70–99)
HCG SERPL QL: NEGATIVE
HCT VFR BLD AUTO: 39.2 % (ref 36–48)
HGB BLD-MCNC: 13.5 G/DL (ref 12–16)
LYMPHOCYTES # BLD: 2.9 K/UL (ref 1–5.1)
LYMPHOCYTES NFR BLD: 40.1 %
MAGNESIUM SERPL-MCNC: 1.94 MG/DL (ref 1.8–2.4)
MCH RBC QN AUTO: 30.6 PG (ref 26–34)
MCHC RBC AUTO-ENTMCNC: 34.5 G/DL (ref 31–36)
MCV RBC AUTO: 88.7 FL (ref 80–100)
METHADONE UR QL SCN>300 NG/ML: ABNORMAL
MONOCYTES # BLD: 0.5 K/UL (ref 0–1.3)
MONOCYTES NFR BLD: 7.5 %
NEUTROPHILS # BLD: 3.5 K/UL (ref 1.7–7.7)
NEUTROPHILS NFR BLD: 48.9 %
NT-PROBNP SERPL-MCNC: <36 PG/ML (ref 0–124)
OPIATES UR QL SCN>300 NG/ML: ABNORMAL
OXYCODONE UR QL SCN: ABNORMAL
PCP UR QL SCN>25 NG/ML: ABNORMAL
PH UR STRIP: 7 [PH]
PLATELET # BLD AUTO: 228 K/UL (ref 135–450)
PMV BLD AUTO: 8.9 FL (ref 5–10.5)
POTASSIUM SERPL-SCNC: 3.8 MMOL/L (ref 3.5–5.1)
PROT SERPL-MCNC: 7.4 G/DL (ref 6.4–8.2)
RBC # BLD AUTO: 4.41 M/UL (ref 4–5.2)
SODIUM SERPL-SCNC: 144 MMOL/L (ref 136–145)
TROPONIN, HIGH SENSITIVITY: 10 NG/L (ref 0–14)
TROPONIN, HIGH SENSITIVITY: 18 NG/L (ref 0–14)
TROPONIN, HIGH SENSITIVITY: <6 NG/L (ref 0–14)
TSH SERPL DL<=0.005 MIU/L-ACNC: 1.83 UIU/ML (ref 0.27–4.2)
WBC # BLD AUTO: 7.2 K/UL (ref 4–11)

## 2025-04-07 PROCEDURE — 6370000000 HC RX 637 (ALT 250 FOR IP): Performed by: EMERGENCY MEDICINE

## 2025-04-07 PROCEDURE — 83735 ASSAY OF MAGNESIUM: CPT

## 2025-04-07 PROCEDURE — 93010 ELECTROCARDIOGRAM REPORT: CPT | Performed by: INTERNAL MEDICINE

## 2025-04-07 PROCEDURE — 84484 ASSAY OF TROPONIN QUANT: CPT

## 2025-04-07 PROCEDURE — 6370000000 HC RX 637 (ALT 250 FOR IP): Performed by: NURSE PRACTITIONER

## 2025-04-07 PROCEDURE — 85379 FIBRIN DEGRADATION QUANT: CPT

## 2025-04-07 PROCEDURE — G0378 HOSPITAL OBSERVATION PER HR: HCPCS

## 2025-04-07 PROCEDURE — 93005 ELECTROCARDIOGRAM TRACING: CPT | Performed by: EMERGENCY MEDICINE

## 2025-04-07 PROCEDURE — 85025 COMPLETE CBC W/AUTO DIFF WBC: CPT

## 2025-04-07 PROCEDURE — 6360000002 HC RX W HCPCS: Performed by: HOSPITALIST

## 2025-04-07 PROCEDURE — 84703 CHORIONIC GONADOTROPIN ASSAY: CPT

## 2025-04-07 PROCEDURE — 6360000004 HC RX CONTRAST MEDICATION: Performed by: EMERGENCY MEDICINE

## 2025-04-07 PROCEDURE — 2500000003 HC RX 250 WO HCPCS: Performed by: HOSPITALIST

## 2025-04-07 PROCEDURE — 71260 CT THORAX DX C+: CPT

## 2025-04-07 PROCEDURE — 83880 ASSAY OF NATRIURETIC PEPTIDE: CPT

## 2025-04-07 PROCEDURE — 71045 X-RAY EXAM CHEST 1 VIEW: CPT

## 2025-04-07 PROCEDURE — 84702 CHORIONIC GONADOTROPIN TEST: CPT

## 2025-04-07 PROCEDURE — 84443 ASSAY THYROID STIM HORMONE: CPT

## 2025-04-07 PROCEDURE — 2580000003 HC RX 258: Performed by: EMERGENCY MEDICINE

## 2025-04-07 PROCEDURE — 80053 COMPREHEN METABOLIC PANEL: CPT

## 2025-04-07 PROCEDURE — 80307 DRUG TEST PRSMV CHEM ANLYZR: CPT

## 2025-04-07 PROCEDURE — 99285 EMERGENCY DEPT VISIT HI MDM: CPT

## 2025-04-07 PROCEDURE — 6370000000 HC RX 637 (ALT 250 FOR IP): Performed by: HOSPITALIST

## 2025-04-07 PROCEDURE — 96372 THER/PROPH/DIAG INJ SC/IM: CPT

## 2025-04-07 RX ORDER — POLYETHYLENE GLYCOL 3350 17 G/17G
17 POWDER, FOR SOLUTION ORAL DAILY PRN
Status: DISCONTINUED | OUTPATIENT
Start: 2025-04-07 | End: 2025-04-09 | Stop reason: HOSPADM

## 2025-04-07 RX ORDER — POTASSIUM CHLORIDE 1500 MG/1
40 TABLET, EXTENDED RELEASE ORAL PRN
Status: DISCONTINUED | OUTPATIENT
Start: 2025-04-07 | End: 2025-04-09 | Stop reason: HOSPADM

## 2025-04-07 RX ORDER — NITROGLYCERIN 0.4 MG/1
0.4 TABLET SUBLINGUAL EVERY 5 MIN PRN
Status: DISCONTINUED | OUTPATIENT
Start: 2025-04-07 | End: 2025-04-09 | Stop reason: HOSPADM

## 2025-04-07 RX ORDER — ASPIRIN 81 MG/1
81 TABLET, CHEWABLE ORAL DAILY
Status: DISCONTINUED | OUTPATIENT
Start: 2025-04-08 | End: 2025-04-09 | Stop reason: HOSPADM

## 2025-04-07 RX ORDER — POTASSIUM CHLORIDE 7.45 MG/ML
10 INJECTION INTRAVENOUS PRN
Status: DISCONTINUED | OUTPATIENT
Start: 2025-04-07 | End: 2025-04-09 | Stop reason: HOSPADM

## 2025-04-07 RX ORDER — ONDANSETRON 4 MG/1
4 TABLET, ORALLY DISINTEGRATING ORAL EVERY 8 HOURS PRN
Status: DISCONTINUED | OUTPATIENT
Start: 2025-04-07 | End: 2025-04-09 | Stop reason: HOSPADM

## 2025-04-07 RX ORDER — 0.9 % SODIUM CHLORIDE 0.9 %
500 INTRAVENOUS SOLUTION INTRAVENOUS ONCE
Status: COMPLETED | OUTPATIENT
Start: 2025-04-07 | End: 2025-04-07

## 2025-04-07 RX ORDER — SODIUM CHLORIDE 9 MG/ML
INJECTION, SOLUTION INTRAVENOUS PRN
Status: DISCONTINUED | OUTPATIENT
Start: 2025-04-07 | End: 2025-04-09 | Stop reason: SDUPTHER

## 2025-04-07 RX ORDER — ACETAMINOPHEN 325 MG/1
650 TABLET ORAL EVERY 6 HOURS PRN
Status: DISCONTINUED | OUTPATIENT
Start: 2025-04-07 | End: 2025-04-09 | Stop reason: HOSPADM

## 2025-04-07 RX ORDER — SODIUM CHLORIDE 0.9 % (FLUSH) 0.9 %
5-40 SYRINGE (ML) INJECTION EVERY 12 HOURS SCHEDULED
Status: DISCONTINUED | OUTPATIENT
Start: 2025-04-07 | End: 2025-04-09 | Stop reason: SDUPTHER

## 2025-04-07 RX ORDER — MAGNESIUM SULFATE IN WATER 40 MG/ML
2000 INJECTION, SOLUTION INTRAVENOUS PRN
Status: DISCONTINUED | OUTPATIENT
Start: 2025-04-07 | End: 2025-04-09 | Stop reason: HOSPADM

## 2025-04-07 RX ORDER — IOPAMIDOL 755 MG/ML
75 INJECTION, SOLUTION INTRAVASCULAR
Status: COMPLETED | OUTPATIENT
Start: 2025-04-07 | End: 2025-04-07

## 2025-04-07 RX ORDER — ASPIRIN 81 MG/1
324 TABLET, CHEWABLE ORAL ONCE
Status: COMPLETED | OUTPATIENT
Start: 2025-04-07 | End: 2025-04-07

## 2025-04-07 RX ORDER — ONDANSETRON 2 MG/ML
4 INJECTION INTRAMUSCULAR; INTRAVENOUS EVERY 6 HOURS PRN
Status: DISCONTINUED | OUTPATIENT
Start: 2025-04-07 | End: 2025-04-09 | Stop reason: HOSPADM

## 2025-04-07 RX ORDER — SODIUM CHLORIDE 0.9 % (FLUSH) 0.9 %
5-40 SYRINGE (ML) INJECTION PRN
Status: DISCONTINUED | OUTPATIENT
Start: 2025-04-07 | End: 2025-04-09 | Stop reason: SDUPTHER

## 2025-04-07 RX ORDER — MECOBALAMIN 5000 MCG
10 TABLET,DISINTEGRATING ORAL NIGHTLY
Status: DISCONTINUED | OUTPATIENT
Start: 2025-04-07 | End: 2025-04-09 | Stop reason: HOSPADM

## 2025-04-07 RX ORDER — ENOXAPARIN SODIUM 100 MG/ML
40 INJECTION SUBCUTANEOUS DAILY
Status: DISCONTINUED | OUTPATIENT
Start: 2025-04-07 | End: 2025-04-09 | Stop reason: HOSPADM

## 2025-04-07 RX ORDER — REGADENOSON 0.08 MG/ML
0.4 INJECTION, SOLUTION INTRAVENOUS
Status: CANCELLED | OUTPATIENT
Start: 2025-04-07

## 2025-04-07 RX ORDER — ACETAMINOPHEN 650 MG/1
650 SUPPOSITORY RECTAL EVERY 6 HOURS PRN
Status: DISCONTINUED | OUTPATIENT
Start: 2025-04-07 | End: 2025-04-09 | Stop reason: HOSPADM

## 2025-04-07 RX ORDER — ATORVASTATIN CALCIUM 40 MG/1
40 TABLET, FILM COATED ORAL NIGHTLY
Status: DISCONTINUED | OUTPATIENT
Start: 2025-04-07 | End: 2025-04-09 | Stop reason: HOSPADM

## 2025-04-07 RX ADMIN — Medication 10 ML: at 21:53

## 2025-04-07 RX ADMIN — SODIUM CHLORIDE 500 ML: 0.9 INJECTION, SOLUTION INTRAVENOUS at 18:12

## 2025-04-07 RX ADMIN — SODIUM CHLORIDE 500 ML: 0.9 INJECTION, SOLUTION INTRAVENOUS at 17:39

## 2025-04-07 RX ADMIN — ASPIRIN 324 MG: 81 TABLET, CHEWABLE ORAL at 19:23

## 2025-04-07 RX ADMIN — IOPAMIDOL 75 ML: 755 INJECTION, SOLUTION INTRAVENOUS at 17:09

## 2025-04-07 RX ADMIN — Medication 10 MG: at 23:12

## 2025-04-07 RX ADMIN — ENOXAPARIN SODIUM 40 MG: 100 INJECTION SUBCUTANEOUS at 21:52

## 2025-04-07 RX ADMIN — ATORVASTATIN CALCIUM 40 MG: 40 TABLET, FILM COATED ORAL at 21:52

## 2025-04-07 ASSESSMENT — PAIN DESCRIPTION - ORIENTATION: ORIENTATION: LEFT

## 2025-04-07 ASSESSMENT — PAIN DESCRIPTION - DESCRIPTORS: DESCRIPTORS: ACHING

## 2025-04-07 ASSESSMENT — LIFESTYLE VARIABLES
HOW MANY STANDARD DRINKS CONTAINING ALCOHOL DO YOU HAVE ON A TYPICAL DAY: PATIENT DOES NOT DRINK
HOW OFTEN DO YOU HAVE A DRINK CONTAINING ALCOHOL: NEVER
HOW OFTEN DO YOU HAVE A DRINK CONTAINING ALCOHOL: NEVER

## 2025-04-07 ASSESSMENT — PAIN DESCRIPTION - LOCATION: LOCATION: CHEST

## 2025-04-07 ASSESSMENT — PAIN SCALES - GENERAL
PAINLEVEL_OUTOF10: 3
PAINLEVEL_OUTOF10: 0

## 2025-04-07 ASSESSMENT — PAIN - FUNCTIONAL ASSESSMENT: PAIN_FUNCTIONAL_ASSESSMENT: 0-10

## 2025-04-07 ASSESSMENT — HEART SCORE
ECG: NON-SPECIFC REPOLARIZATION DISTURBANCE/LBTB/PM
ECG: NON-SPECIFC REPOLARIZATION DISTURBANCE/LBTB/PM

## 2025-04-07 NOTE — H&P
[facility] Medicine History & Physical    V 1.6    Date of Admission: 4/7/2025    Date of Service:  Pt seen/examined on 4/7/25    []Admitted to Inpatient with expected LOS greater than two midnights due to medical therapy.  []Placed in Observation status.    Chief Admission Complaint:  chest pain    Presenting Admission History:      39 y.o. female with hx palpitations admitted for left sided CP that started around 3pm while at work. She works at a pre-school.The CP was left-sided, non-radiating, 8/10 scale, \"heaviness\" associated with headache and palpitations. She was checked by one of her colleagues who is a nurse at  and she checked her pulse ox which showed heart rate to 220 and she was brought to ED accompanied by her colleague.  By the time she presented to the ED, her symptoms had resolved. Initial troponin was <6 then 10 then 18. EKG showed NSR.   In 2019, she was seen by cardiology for palpitations and dizziness. She was also found to be anemic at that time due to abnormal vaginal bleeding, and she ultimately required a hysterectomy and iron infusions. She also has a strong family history of CAD with MI's in her mother and father. She denies reflux sx, anxiety, or lifting any heavy objects.    Assessment/Plan:      Current Principal Problem:  Chest pain    Chest pain   - monitor on telemetry   - trend troponins   - ASA, statin   - PRN NTG   - echo   - NPO at [facility] for stress test   - cardiology consult  Palpitations   - monitor on tele   - cardiology consult ? Holter monitor    Labs/medications/imaging reviewed    Dispo: progressing as expected      Discussed management and the need for Hospitalization of the patient w/ the Emergency Department Provider: Yes    CXR: I have reviewed the CXR with the following interpretation: NAPD  EKG:  I have reviewed the EKG with the following interpretation: NSR    Physical Exam Performed:      /82   Pulse 92   Temp 98.6 °F (37 °C) (Oral)   Resp 15   Ht  with anxiety and depression     PMDD    Allergic rhinitis : IgE labs    dustmite, mold, walnut tree    Anxiety     Dysmenorrhea     Latex allergy     RAD (reactive airway disease)     : Vit. D Insufficiency       Past Surgical History:        Procedure Laterality Date     SECTION  02/15/2016     SECTION, LOW TRANSVERSE  3/1/14    PRE-MALIGNANT / BENIGN SKIN LESION EXCISION  ,    Giant Nevus removed, scar tissue revised    TUBAL LIGATION Bilateral 02/15/2016    WISDOM TOOTH EXTRACTION Bilateral age 18       Medications Prior to Admission:   Prior to Admission medications    Not on File       Labs: Personally reviewed and interpreted for clinical significance.   Recent Labs     25  1611   WBC 7.2   HGB 13.5   HCT 39.2        Recent Labs     25  1611      K 3.8      CO2 25   BUN 13   CREATININE 0.8   CALCIUM 9.3   MG 1.94     Recent Labs     25  1611 25  1739 25  1845   PROBNP <36  --   --    TROPHS <6 10 18*     No results for input(s): \"LABA1C\" in the last 72 hours.  Recent Labs     25  1611   AST 19   ALT 18   BILITOT <0.2   ALKPHOS 70     No results for input(s): \"INR\", \"LACTA\", \"TSH\" in the last 72 hours.     Nestor Rodriguez MD

## 2025-04-07 NOTE — ED PROVIDER NOTES
[facility] EMERGENCY DEPARTMENT  EMERGENCY DEPARTMENT ENCOUNTER        Patient Name: Tana Cerda  MRN: [id number]  Birthdate 1985  Date of evaluation: 4/7/2025  PCP: Jeancarlos Boo MD  Note Started: 4:24 PM EDT 4/7/25      CHIEF COMPLAINT  Palpitation and dizziness   (Pt was at work today and began having L sided CP and palpitations approx 1500.  Pt states she used pulse-ox and pulse was 220.  Currently doesn't feel palpitatins but states she 'doesn't feel right'.   HX of Afib several years ago, states she spontaneously converted.)       HISTORY & PHYSICAL     HISTORY OF PRESENT ILLNESS  History from : Patient    Limitations to history : None    Tana Cerda is a 39 y.o. female  has a past medical history of Acne, Adjustment reaction with anxiety and depression, Allergic rhinitis (8/07: IgE labs), Anxiety, Dysmenorrhea, Latex allergy, and RAD (reactive airway disease)., who presents to the ED complaining of palpitation and dizziness started today around 3 PM.  Patient reported feeling palpitations and dizziness while she was working in  with kids around.  During this episode of palpitation she felt dizzy but there was no associated chest pain, sweating, chills or fever.  She was held by one of her colleagues who is a nurse at  and she checked her pulse ox which showed heart rate to 220 and she was brought to ED accompanied by her colleague.  She denied having recent fever, infections, history of travel, immobilization, shortness of breath or chest pain.  No history of leg swelling.  She has history of palpitations for last 12 years for which she has been visiting ED on multiple occasions and her EKGs done in the past were normal.   In 2019, she was seen by cardiologist due to palpitations and dizziness.  She was found to be anemic and referred to hematologist who referred her to OB/GYN due to abnormal vaginal bleeding and anemia.  She was treated with hysterectomy  followed by iron therapy for anemia.  She has strong family history of myocardial infarction including MI in father and mother.  History of stroke in father and 38-year-old sister.  On arrival in ED her pulse rate was 98 with regular rhythm.  Vital stable.  EKG is done showing sinus tachycardia with heart rate 95 bpm.     Old records reviewed:  Previous EKGs    No other complaints, modifying factors or associated symptoms.  Nursing Notes were all reviewed and agreed with or any disagreements were addressed in the HPI.    I have reviewed the following from the nursing documentation.    Past Medical History:   Diagnosis Date    Acne     Adjustment reaction with anxiety and depression     PMDD    Allergic rhinitis : IgE labs    dustmite, mold, walnut tree    Anxiety     Dysmenorrhea     Latex allergy     RAD (reactive airway disease)     : Vit. D Insufficiency     Past Surgical History:   Procedure Laterality Date     SECTION  02/15/2016     SECTION, LOW TRANSVERSE  3/1/14    PRE-MALIGNANT / BENIGN SKIN LESION EXCISION  ,    Giant Nevus removed, scar tissue revised    TUBAL LIGATION Bilateral 02/15/2016    WISDOM TOOTH EXTRACTION Bilateral age 18     Family History   Adopted: Yes     Social History     Socioeconomic History    Marital status:      Spouse name: Pasquale    Number of children: 2    Years of education: College    Highest education level: Not on file   Occupational History    Occupation: Teacher: Gymnastics   Tobacco Use    Smoking status: Never    Smokeless tobacco: Never   Substance and Sexual Activity    Alcohol use: No     Alcohol/week: 0.0 standard drinks of alcohol    Drug use: Yes     Types: Marijuana (Weed)    Sexual activity: Yes     Partners: Male     Birth control/protection: Surgical     Comment: tubal ligation   Other Topics Concern    Not on file   Social History Narrative    Not on file     Social Drivers of Health     Financial Resource Strain: Low Risk

## 2025-04-07 NOTE — ED PROVIDER NOTES
THIS IS MY RESIDENT SUPERVISORY AND SHARED VISIT NOTE:    I personally saw the patient and made/approved the management plan and take responsibility for the patient management.    I independently performed a history and physical on Tana Cerda.   All diagnostic, treatment, and disposition decisions were made by myself in conjunction with the resident physician. I personally saw the patient and performed a substantive portion of the visit including all aspects of the medical decision making.      For further details of Tana Cerda's emergency department encounter, please see Rina Noriega MD's documentation.        History: Patient is a 39-year-old female presenting today due to concern for developing chest pain with shortness of breath and lightheadedness just prior to arrival.  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  By the time she got to the emergency department, she reported her chest pain was mostly gone and she was feeling better.  No reported falls or trauma.  No syncope.  She did have a slight headache at that time.  No unilateral numbness or weakness.  No fever.  No vomiting or diarrhea.  She felt fine when she woke up this morning.  Her mother just  of a heart attack last year and she was only in her 50s.  The patient denies any smoking history but again does report significant family history of heart disease.  She denies any leg swelling or history of blood clots personally.  No pain with deep breaths.  No fever.  Due to concern for significant chest pain along with lightheadedness just prior to arrival, she came to the ED for further evaluation.  She reports having a similar episode with the tachycardia in the past years ago requiring Holter monitor.        Physical exam:   Gen: No acute distress.  Alert and answering questions appropriately.  Pysch: Anxious mood and affect  HEENT: NCAT, MMM  Neck: supple, normal range of motion  Cardiac: Tachycardia, regular  further cardiac testing.  She was otherwise pain-free when I reevaluated her prior to admission and felt comfortable with this plan.  Benefit of admission outweighs the risk at this time.    No reported numbness or weakness during evaluation and story not suggestive of TIA/stroke.  She reports being able to ambulate to the ED.      Exclusion criteria - the patient is NOT to be included for SEP-1 Core Measure due to: Infection is not suspected      Hubert Camp MD  04/08/25 0949       Hubert Camp MD  04/08/25 0976

## 2025-04-07 NOTE — ED NOTES
Tana Cerda is a 39 y.o. female admitted for  Principal Problem:    Chest pain  Resolved Problems:    * No resolved hospital problems. *  .   Patient Home via self with   Chief Complaint   Patient presents with    Chest Pain     Pt was at work today and began having L sided CP and palpitations approx 1500.  Pt states she used pulse-ox and pulse was 220.  Currently doesn't feel palpitatins but states she 'doesn't feel right'.   HX of Afib several years ago, states she spontaneously converted.   .  Patient is alert and Person, Place, Time, and Situation  Patient's baseline mobility: Baseline Mobility: Independent   Code Status: Full Code   Cardiac Rhythm:       Is patient on baseline Oxygen: no how many Liters  Abnormal Assessment Findings:Chest pain    Isolation:       NIH Score:    C-SSRS: Risk of Suicide: No Risk  Bedside swallow:        Active LDA's:   Peripheral IV 04/07/25 Left;Proximal Forearm (Active)           Family/Caregiver Present yes Any Concerns: no   Restraints no  Sitter no         Vitals: MEWS Score: 1    Vitals:    04/07/25 1814 04/07/25 1848 04/07/25 1855 04/07/25 1924   BP:  115/78 117/78 117/82   Pulse: 96 90 94 92   Resp:  20 17 15   Temp:       TempSrc:       SpO2:  100% 100% 100%   Weight:       Height:           Last documented pain score (0-10 scale) Pain Level: 3  Pain medication administered Yes- see MAR. ASA    Pertinent or High Risk Medications/Drips:     Pending Blood Product Administration: no    Abnormal labs:   Abnormal Labs Reviewed   URINE DRUG SCREEN - Abnormal; Notable for the following components:       Result Value    Cannabinoid Scrn, Ur POSITIVE (*)     All other components within normal limits   TROPONIN - Abnormal; Notable for the following components:    Troponin, High Sensitivity 18 (*)     All other components within normal limits     Critical values: no  Intervention for critical value(s):     Abnormal Imaging: See imaging             You may also review the ED PT

## 2025-04-08 ENCOUNTER — ANCILLARY PROCEDURE (OUTPATIENT)
Dept: CARDIOLOGY CLINIC | Age: 40
End: 2025-04-08

## 2025-04-08 ENCOUNTER — APPOINTMENT (OUTPATIENT)
Age: 40
End: 2025-04-08
Attending: HOSPITALIST
Payer: COMMERCIAL

## 2025-04-08 ENCOUNTER — APPOINTMENT (OUTPATIENT)
Dept: NUCLEAR MEDICINE | Age: 40
End: 2025-04-08
Attending: HOSPITALIST
Payer: COMMERCIAL

## 2025-04-08 ENCOUNTER — TELEPHONE (OUTPATIENT)
Dept: CARDIAC CATH/INVASIVE PROCEDURES | Age: 40
End: 2025-04-08

## 2025-04-08 DIAGNOSIS — R00.0 SINUS TACHYCARDIA: ICD-10-CM

## 2025-04-08 DIAGNOSIS — R55 NEAR SYNCOPE: ICD-10-CM

## 2025-04-08 LAB
ANION GAP SERPL CALCULATED.3IONS-SCNC: 8 MMOL/L (ref 3–16)
BILIRUB UR QL STRIP.AUTO: NEGATIVE
BUN SERPL-MCNC: 10 MG/DL (ref 7–20)
CALCIUM SERPL-MCNC: 8.2 MG/DL (ref 8.3–10.6)
CHLORIDE SERPL-SCNC: 108 MMOL/L (ref 99–110)
CHOLEST SERPL-MCNC: 111 MG/DL (ref 0–199)
CLARITY UR: CLEAR
CO2 SERPL-SCNC: 24 MMOL/L (ref 21–32)
COLOR UR: YELLOW
CREAT SERPL-MCNC: 0.7 MG/DL (ref 0.6–1.1)
DEPRECATED RDW RBC AUTO: 13.2 % (ref 12.4–15.4)
ECHO AO ASC DIAM: 2.9 CM
ECHO AO ASCENDING AORTA INDEX: 1.67 CM/M2
ECHO AO ROOT DIAM: 2.5 CM
ECHO AO ROOT INDEX: 1.44 CM/M2
ECHO AV AREA PEAK VELOCITY: 2.3 CM2
ECHO AV AREA VTI: 2.2 CM2
ECHO AV AREA/BSA PEAK VELOCITY: 1.3 CM2/M2
ECHO AV AREA/BSA VTI: 1.3 CM2/M2
ECHO AV MEAN GRADIENT: 3 MMHG
ECHO AV MEAN GRADIENT: 3 MMHG
ECHO AV MEAN VELOCITY: 0.8 M/S
ECHO AV PEAK GRADIENT: 5 MMHG
ECHO AV PEAK VELOCITY: 1.1 M/S
ECHO AV VELOCITY RATIO: 0.82
ECHO AV VTI: 21.9 CM
ECHO BSA: 1.78 M2
ECHO EST RA PRESSURE: 3 MMHG
ECHO LA AREA 2C: 7.5 CM2
ECHO LA AREA 4C: 9.9 CM2
ECHO LA DIAMETER INDEX: 1.49 CM/M2
ECHO LA DIAMETER: 2.6 CM
ECHO LA MAJOR AXIS: 4.8 CM
ECHO LA MINOR AXIS: 3.1 CM
ECHO LA TO AORTIC ROOT RATIO: 1.04
ECHO LA VOL MOD A2C: 15 ML (ref 22–52)
ECHO LA VOL MOD A4C: 16 ML (ref 22–52)
ECHO LA VOLUME INDEX MOD A2C: 9 ML/M2 (ref 16–34)
ECHO LA VOLUME INDEX MOD A4C: 9 ML/M2 (ref 16–34)
ECHO LV E' LATERAL VELOCITY: 14.7 CM/S
ECHO LV E' SEPTAL VELOCITY: 11.5 CM/S
ECHO LV EDV 3D: 102 ML
ECHO LV EDV INDEX 3D: 59 ML/M2
ECHO LV EF PHYSICIAN: 60 %
ECHO LV EJECTION FRACTION 3D: 58 %
ECHO LV ESV 3D: 42 ML
ECHO LV ESV INDEX 3D: 24 ML/M2
ECHO LV FRACTIONAL SHORTENING: 33 % (ref 28–44)
ECHO LV GLOBAL LONGITUDINAL STRAIN (GLS): -19.9 %
ECHO LV INTERNAL DIMENSION DIASTOLE INDEX: 2.59 CM/M2
ECHO LV INTERNAL DIMENSION DIASTOLIC: 4.5 CM (ref 3.9–5.3)
ECHO LV INTERNAL DIMENSION SYSTOLIC INDEX: 1.72 CM/M2
ECHO LV INTERNAL DIMENSION SYSTOLIC: 3 CM
ECHO LV IVSD: 1 CM (ref 0.6–0.9)
ECHO LV MASS 2D: 132.8 G (ref 67–162)
ECHO LV MASS 3D INDEX: 74.7 G/M2
ECHO LV MASS 3D: 130 G
ECHO LV MASS INDEX 2D: 76.3 G/M2 (ref 43–95)
ECHO LV POSTERIOR WALL DIASTOLIC: 0.8 CM (ref 0.6–0.9)
ECHO LV RELATIVE WALL THICKNESS RATIO: 0.36
ECHO LVOT AREA: 2.8 CM2
ECHO LVOT AV VTI INDEX: 0.79
ECHO LVOT DIAM: 1.9 CM
ECHO LVOT MEAN GRADIENT: 2 MMHG
ECHO LVOT PEAK GRADIENT: 3 MMHG
ECHO LVOT PEAK VELOCITY: 0.9 M/S
ECHO LVOT STROKE VOLUME INDEX: 28.2 ML/M2
ECHO LVOT SV: 49 ML
ECHO LVOT VTI: 17.3 CM
ECHO MV A VELOCITY: 0.59 M/S
ECHO MV E DECELERATION TIME (DT): 190 MS
ECHO MV E VELOCITY: 0.68 M/S
ECHO MV E/A RATIO: 1.15
ECHO MV E/E' LATERAL: 4.63
ECHO MV E/E' RATIO (AVERAGED): 5.27
ECHO MV E/E' SEPTAL: 5.91
ECHO PV MAX VELOCITY: 0.8 M/S
ECHO PV PEAK GRADIENT: 2 MMHG
ECHO RA AREA 4C: 10.5 CM2
ECHO RA END SYSTOLIC VOLUME APICAL 4 CHAMBER INDEX BSA: 11 ML/M2
ECHO RA VOLUME: 20 ML
ECHO RIGHT VENTRICULAR SYSTOLIC PRESSURE (RVSP): 21 MMHG
ECHO RV BASAL DIMENSION: 3.5 CM
ECHO RV FREE WALL PEAK S': 12.4 CM/S
ECHO RV LONGITUDINAL DIMENSION: 7.1 CM
ECHO RV MID DIMENSION: 2.8 CM
ECHO RV TAPSE: 1.8 CM (ref 1.7–?)
ECHO TV REGURGITANT MAX VELOCITY: 2.14 M/S
ECHO TV REGURGITANT PEAK GRADIENT: 18 MMHG
GFR SERPLBLD CREATININE-BSD FMLA CKD-EPI: >90 ML/MIN/{1.73_M2}
GLUCOSE SERPL-MCNC: 101 MG/DL (ref 70–99)
GLUCOSE UR STRIP.AUTO-MCNC: NEGATIVE MG/DL
HCT VFR BLD AUTO: 35.6 % (ref 36–48)
HDLC SERPL-MCNC: 35 MG/DL (ref 40–60)
HGB BLD-MCNC: 12.1 G/DL (ref 12–16)
HGB UR QL STRIP.AUTO: ABNORMAL
KETONES UR STRIP.AUTO-MCNC: NEGATIVE MG/DL
LDLC SERPL CALC-MCNC: 45 MG/DL
LEUKOCYTE ESTERASE UR QL STRIP.AUTO: ABNORMAL
MCH RBC QN AUTO: 30.5 PG (ref 26–34)
MCHC RBC AUTO-ENTMCNC: 34.1 G/DL (ref 31–36)
MCV RBC AUTO: 89.4 FL (ref 80–100)
NITRITE UR QL STRIP.AUTO: NEGATIVE
NUC STRESS EJECTION FRACTION: 71 %
NUC STRESS LV EDV: 82 ML (ref 56–104)
NUC STRESS LV ESV: 24 ML (ref 19–49)
NUC STRESS LV MASS: 123 G
PH UR STRIP.AUTO: 7.5 [PH] (ref 5–8)
PLATELET # BLD AUTO: 205 K/UL (ref 135–450)
PMV BLD AUTO: 9 FL (ref 5–10.5)
POTASSIUM SERPL-SCNC: 3.6 MMOL/L (ref 3.5–5.1)
PROT UR STRIP.AUTO-MCNC: NEGATIVE MG/DL
RBC # BLD AUTO: 3.98 M/UL (ref 4–5.2)
RBC #/AREA URNS HPF: NORMAL /HPF (ref 0–4)
SODIUM SERPL-SCNC: 140 MMOL/L (ref 136–145)
SP GR UR STRIP.AUTO: 1.02 (ref 1–1.03)
STRESS BASELINE DIAS BP: 66 MMHG
STRESS BASELINE HR: 86 BPM
STRESS BASELINE SYS BP: 110 MMHG
STRESS ESTIMATED WORKLOAD: 1 METS
STRESS PEAK DIAS BP: 74 MMHG
STRESS PEAK SYS BP: 139 MMHG
STRESS PERCENT HR ACHIEVED: 77 %
STRESS POST PEAK HR: 141 BPM
STRESS RATE PRESSURE PRODUCT: NORMAL BPM*MMHG
STRESS TARGET HR: 183 BPM
TRIGL SERPL-MCNC: 153 MG/DL (ref 0–150)
TROPONIN, HIGH SENSITIVITY: 10 NG/L (ref 0–14)
TROPONIN, HIGH SENSITIVITY: <6 NG/L (ref 0–14)
UA COMPLETE W REFLEX CULTURE PNL UR: ABNORMAL
UA DIPSTICK W REFLEX MICRO PNL UR: YES
URN SPEC COLLECT METH UR: ABNORMAL
UROBILINOGEN UR STRIP-ACNC: 0.2 E.U./DL
VLDLC SERPL CALC-MCNC: 31 MG/DL
WBC # BLD AUTO: 6.4 K/UL (ref 4–11)
WBC #/AREA URNS HPF: NORMAL /HPF (ref 0–5)

## 2025-04-08 PROCEDURE — 78452 HT MUSCLE IMAGE SPECT MULT: CPT | Performed by: INTERNAL MEDICINE

## 2025-04-08 PROCEDURE — 93017 CV STRESS TEST TRACING ONLY: CPT

## 2025-04-08 PROCEDURE — 81001 URINALYSIS AUTO W/SCOPE: CPT

## 2025-04-08 PROCEDURE — 36415 COLL VENOUS BLD VENIPUNCTURE: CPT

## 2025-04-08 PROCEDURE — 2500000003 HC RX 250 WO HCPCS: Performed by: HOSPITALIST

## 2025-04-08 PROCEDURE — A9502 TC99M TETROFOSMIN: HCPCS | Performed by: HOSPITALIST

## 2025-04-08 PROCEDURE — 78452 HT MUSCLE IMAGE SPECT MULT: CPT

## 2025-04-08 PROCEDURE — G0378 HOSPITAL OBSERVATION PER HR: HCPCS

## 2025-04-08 PROCEDURE — 99222 1ST HOSP IP/OBS MODERATE 55: CPT | Performed by: INTERNAL MEDICINE

## 2025-04-08 PROCEDURE — 93356 MYOCRD STRAIN IMG SPCKL TRCK: CPT | Performed by: INTERNAL MEDICINE

## 2025-04-08 PROCEDURE — 3430000000 HC RX DIAGNOSTIC RADIOPHARMACEUTICAL: Performed by: HOSPITALIST

## 2025-04-08 PROCEDURE — 6370000000 HC RX 637 (ALT 250 FOR IP): Performed by: NURSE PRACTITIONER

## 2025-04-08 PROCEDURE — 84484 ASSAY OF TROPONIN QUANT: CPT

## 2025-04-08 PROCEDURE — 93018 CV STRESS TEST I&R ONLY: CPT | Performed by: INTERNAL MEDICINE

## 2025-04-08 PROCEDURE — 6370000000 HC RX 637 (ALT 250 FOR IP): Performed by: HOSPITALIST

## 2025-04-08 PROCEDURE — 6360000002 HC RX W HCPCS: Performed by: HOSPITALIST

## 2025-04-08 PROCEDURE — 96372 THER/PROPH/DIAG INJ SC/IM: CPT

## 2025-04-08 PROCEDURE — 93306 TTE W/DOPPLER COMPLETE: CPT | Performed by: INTERNAL MEDICINE

## 2025-04-08 PROCEDURE — 85027 COMPLETE CBC AUTOMATED: CPT

## 2025-04-08 PROCEDURE — 93016 CV STRESS TEST SUPVJ ONLY: CPT | Performed by: INTERNAL MEDICINE

## 2025-04-08 PROCEDURE — 76376 3D RENDER W/INTRP POSTPROCES: CPT | Performed by: INTERNAL MEDICINE

## 2025-04-08 PROCEDURE — 80048 BASIC METABOLIC PNL TOTAL CA: CPT

## 2025-04-08 PROCEDURE — 76376 3D RENDER W/INTRP POSTPROCES: CPT

## 2025-04-08 PROCEDURE — 80061 LIPID PANEL: CPT

## 2025-04-08 RX ORDER — NITROGLYCERIN 0.4 MG/1
0.8 TABLET SUBLINGUAL PRN
Status: DISCONTINUED | OUTPATIENT
Start: 2025-04-08 | End: 2025-04-09 | Stop reason: HOSPADM

## 2025-04-08 RX ORDER — NITROGLYCERIN 0.4 MG/1
0.4 TABLET SUBLINGUAL PRN
Status: DISCONTINUED | OUTPATIENT
Start: 2025-04-08 | End: 2025-04-09 | Stop reason: HOSPADM

## 2025-04-08 RX ORDER — AMINOPHYLLINE 25 MG/ML
75 INJECTION, SOLUTION INTRAVENOUS ONCE
Status: COMPLETED | OUTPATIENT
Start: 2025-04-08 | End: 2025-04-08

## 2025-04-08 RX ORDER — METOPROLOL TARTRATE 1 MG/ML
5 INJECTION, SOLUTION INTRAVENOUS EVERY 5 MIN PRN
Status: COMPLETED | OUTPATIENT
Start: 2025-04-08 | End: 2025-04-09

## 2025-04-08 RX ORDER — METOPROLOL TARTRATE 50 MG
100 TABLET ORAL PRN
Status: COMPLETED | OUTPATIENT
Start: 2025-04-08 | End: 2025-04-09

## 2025-04-08 RX ORDER — METOPROLOL TARTRATE 50 MG
150 TABLET ORAL PRN
Status: COMPLETED | OUTPATIENT
Start: 2025-04-08 | End: 2025-04-09

## 2025-04-08 RX ORDER — SODIUM CHLORIDE 9 MG/ML
INJECTION, SOLUTION INTRAVENOUS PRN
Status: DISCONTINUED | OUTPATIENT
Start: 2025-04-08 | End: 2025-04-09 | Stop reason: HOSPADM

## 2025-04-08 RX ORDER — REGADENOSON 0.08 MG/ML
0.4 INJECTION, SOLUTION INTRAVENOUS
Status: COMPLETED | OUTPATIENT
Start: 2025-04-08 | End: 2025-04-08

## 2025-04-08 RX ORDER — METOPROLOL TARTRATE 50 MG
50 TABLET ORAL PRN
Status: COMPLETED | OUTPATIENT
Start: 2025-04-08 | End: 2025-04-09

## 2025-04-08 RX ORDER — SODIUM CHLORIDE 0.9 % (FLUSH) 0.9 %
5-40 SYRINGE (ML) INJECTION EVERY 12 HOURS SCHEDULED
Status: DISCONTINUED | OUTPATIENT
Start: 2025-04-08 | End: 2025-04-09 | Stop reason: HOSPADM

## 2025-04-08 RX ORDER — SODIUM CHLORIDE 0.9 % (FLUSH) 0.9 %
5-40 SYRINGE (ML) INJECTION PRN
Status: DISCONTINUED | OUTPATIENT
Start: 2025-04-08 | End: 2025-04-09 | Stop reason: HOSPADM

## 2025-04-08 RX ADMIN — TETROFOSMIN 32.1 MILLICURIE: 1.38 INJECTION, POWDER, LYOPHILIZED, FOR SOLUTION INTRAVENOUS at 09:15

## 2025-04-08 RX ADMIN — Medication 10 ML: at 19:52

## 2025-04-08 RX ADMIN — ATORVASTATIN CALCIUM 40 MG: 40 TABLET, FILM COATED ORAL at 19:52

## 2025-04-08 RX ADMIN — AMINOPHYLLINE 75 MG: 25 INJECTION, SOLUTION INTRAVENOUS at 09:19

## 2025-04-08 RX ADMIN — Medication 10 ML: at 07:50

## 2025-04-08 RX ADMIN — Medication 10 MG: at 19:52

## 2025-04-08 RX ADMIN — ASPIRIN 81 MG: 81 TABLET, CHEWABLE ORAL at 07:48

## 2025-04-08 RX ADMIN — ENOXAPARIN SODIUM 40 MG: 100 INJECTION SUBCUTANEOUS at 07:48

## 2025-04-08 RX ADMIN — REGADENOSON 0.4 MG: 0.08 INJECTION, SOLUTION INTRAVENOUS at 09:15

## 2025-04-08 RX ADMIN — ACETAMINOPHEN 650 MG: 325 TABLET ORAL at 16:27

## 2025-04-08 RX ADMIN — TETROFOSMIN 10.9 MILLICURIE: 1.38 INJECTION, POWDER, LYOPHILIZED, FOR SOLUTION INTRAVENOUS at 07:58

## 2025-04-08 ASSESSMENT — PAIN SCALES - GENERAL
PAINLEVEL_OUTOF10: 0
PAINLEVEL_OUTOF10: 1

## 2025-04-08 ASSESSMENT — PAIN DESCRIPTION - LOCATION: LOCATION: HEAD

## 2025-04-08 ASSESSMENT — PAIN DESCRIPTION - DESCRIPTORS: DESCRIPTORS: ACHING

## 2025-04-08 ASSESSMENT — PAIN DESCRIPTION - ORIENTATION: ORIENTATION: MID;ANTERIOR

## 2025-04-08 NOTE — PLAN OF CARE
Problem: Pain  Goal: Verbalizes/displays adequate comfort level or baseline comfort level  Outcome: Progressing  Flowsheets (Taken 4/8/2025 0026)  Verbalizes/displays adequate comfort level or baseline comfort level:   Encourage patient to monitor pain and request assistance   Administer analgesics based on type and severity of pain and evaluate response   Consider cultural and social influences on pain and pain management   Assess pain using appropriate pain scale   Implement non-pharmacological measures as appropriate and evaluate response  Note: Pt without complaints of pain.

## 2025-04-08 NOTE — PROGRESS NOTES
4 Eyes Skin Assessment and Patient belongings     The patient is being assess for  Admission    I agree that 2 Nurses have performed a thorough Head to Toe Skin Assessment on the patient. ALL assessment sites listed below have been assessed.       Areas assessed by both nurses: yes  [x]   Head, Face, and Ears   [x]   Shoulders, Back, and Chest  [x]   Arms, Elbows, and Hands   [x]   Coccyx, Sacrum, and IschIum  [x]   Legs, Feet, and Heels        Does the Patient have Skin Breakdown?  No         Raymundo Prevention initiated:  NA   Wound Care Orders initiated:  NA      [facility] nurse consulted for Pressure Injury (Stage 3,4, Unstageable, DTI, NWPT, and Complex wounds), New and Established Ostomies:  NA      I agree that 2 Nurses have reviewed patient belongings with the patient/family and documented in the flowsheet upon admission or transfer to the unit.     Belongings  Dental Appliances: None  Vision - Corrective Lenses: None  Hearing Aid: None  Clothing: Socks, Footwear, Pants, Shirt  Jewelry: Ring, Bracelet  Body Piercings Removed: N/A  Electronic Devices: Cell Phone,   Weapons (Notify Protective Services/Security): None  Other Valuables: Purse  Home Medications: None  Valuables Given To: Patient       Nurse 1 eSignature: Electronically signed by Chika Moore RN on 4/7/25 at 10:20 PM EDT    **SHARE this note so that the co-signing nurse is able to place an eSignature**    Nurse 2 eSignature: Electronically signed by Dereck Perez RN on 4/7/25 at 11:56 PM EDT

## 2025-04-08 NOTE — CONSULTS
CARDIOLOGY CONSULTATION        Patient Name: Tana Cerda  Date of admission: 2025  3:44 PM  Admission Dx: Sinus tachycardia [R00.0]  Chest pain [R07.9]  Elevated troponin [R79.89]  Near syncope [R55]  Chest pain, unspecified type [R07.9]  Requesting Physician: Nestor Rodriguez MD  Primary Care physician: Jeancarlos Boo MD    Reason for Consultation/Chief Complaint: palpitations, chest pain    History of Present Illness:     Tana Cerda is a 39 y.o. woman admitted for palpitations and chest pain.  Family member/significant other at bedside.  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  She experienced sudden onset of dizziness and palpitations and had to be helped by one of her colleagues.  A friend who is a nurse placed a pulse ox on her finger and noted elevated heart rates >220 bpm, so she presented to ED for further evaluation.  Patient reports significant family history of CAD and stroke in her father, mother, and 38 year old sister.  Of note, patient also with history of significant anemia that required hysterectomy due to abnormal vaginal bleeding.  Currently patient reports she is chest pain free and completed cardiac testing earlier this morning.  Cardiology consulted to assist with management.    Past Medical History:   has a past medical history of Acne, Adjustment reaction with anxiety and depression, Allergic rhinitis, Anxiety, Dysmenorrhea, Latex allergy, and RAD (reactive airway disease).    Surgical History:   has a past surgical history that includes pre-malignant / benign skin lesion excision (,); [place] tooth extraction (Bilateral, age 18);  section, low transverse (2014);  section (02/15/2016); Tubal ligation (Bilateral, 02/15/2016); and Hysterectomy (2019).     Social History:   reports that she has never smoked. She has never used smokeless tobacco. She reports current drug use. Drug: Marijuana (Weed).  TROPONINI <0.01 04/04/2022       Lab Results   Component Value Date    CHOL 111 04/08/2025     Lab Results   Component Value Date    TRIG 153 (H) 04/08/2025     Lab Results   Component Value Date    HDL 35 (L) 04/08/2025     No components found for: \"LDLCHOLESTEROL\", \"LDLCALC\"  Lab Results   Component Value Date    VLDL 31 04/08/2025     No results found for: \"CHOLHDLRATIO\"     Cardiac Data:     Telemetry personally reviewed:    TSH 1.83  Troponin 10, 18, 10  HDL 35    +THC    EKG 04/08/2025: NSR @ 95 bpm     Echo:    Stress Test:    Cardiac catheterization:    Additional studies:   CT Chest 04/07/2025: IMPRESSION:   1.  Normal CT chest without evidence for PE.    Impression and Plan:      Palpitations  Chest pain  Dizziness  Family history of CAD  THC use    --Echo with saline contrast and nuclear stress testing ordered by primary service is pending  --Place 2 week event monitor upon discharge  --Keep electrolytes replete    Further recs as clinical course progresses.    Cardiology will follow.      I will address the patient's cardiac risk factors and adjusted pharmacologic treatment as needed. In addition, I have reinforced the need for patient directed risk factor modification.  All questions and concerns were addressed to the patient/family. Alternatives to my treatment were discussed.     Thank you for allowing us to participate in the care of Tana KAT Nerissaashley. Please call me with any questions [phone].    Rafa Leiva MD [facility]  Cardiovascular Disease  [facility]  [phone] [place] Office  [phone] [place] Office  4/8/2025 10:19 AM

## 2025-04-08 NOTE — TELEPHONE ENCOUNTER
Monitor company [employer]  Length of monitor 14 days  Monitor ordered by Dr. Rafa Leiva    Patch ID [id number]  Device number [id number]  Monitor given to TWIN Nunez on 3B  Nurse to apply at the time of discharge.

## 2025-04-08 NOTE — PLAN OF CARE
Problem: Discharge Planning  Goal: Discharge to home or other facility with appropriate resources  Outcome: Progressing  Flowsheets (Taken 4/8/2025 1045)  Discharge to home or other facility with appropriate resources: Identify barriers to discharge with patient and caregiver     Problem: Pain  Goal: Verbalizes/displays adequate comfort level or baseline comfort level  Outcome: Progressing

## 2025-04-08 NOTE — PROGRESS NOTES
CTA ordered per cardiology. This nurse spoke with CT and was made aware that since patient has not been NPO for 4 hours prior to CTA, CTA will need to be completed tomorrow as patient has to be ready for testing by no later than 5:00pm. This nurse made Leatha aware in Cardiology to inform Dr. Leiva that CTA will need to be performed tomorrow and that patient will be NPO at midnight for testing tomorrow. Gloria HOROWITZ made aware.

## 2025-04-08 NOTE — PROGRESS NOTES
[facility] Medicine Progress Note  V 1.6      Date of Admission: 4/7/2025    Hospital Day: 2      Chief Admission Complaint:    Chest Pain (Pt was at work today and began having L sided CP and palpitations approx 1500.  Pt states she used pulse-ox and pulse was 220.  Currently doesn't feel palpitatins but states she 'doesn't feel right'.   HX of Afib several years ago, states she spontaneously converted.)        Subjective:     Patient returned from stress test and results pending. She denies chest pain, SOB, ARNOLD, or BLE edema. She is having her ECHO completed during my assessment.     Presenting Admission History:       This is a 39 y.o. female with hx palpitations admitted for left sided CP that started around 3pm while at work. She works at a pre-school.The CP was left-sided, non-radiating, 8/10 scale, \"heaviness\" associated with headache and palpitations. She was checked by one of her colleagues who is a nurse at  and she checked her pulse ox which showed heart rate to 220 and she was brought to ED accompanied by her colleague.  By the time she presented to the ED, her symptoms had resolved. Initial troponin was <6 then 10 then 18. EKG showed NSR.     In 2019, she was seen by cardiology for palpitations and dizziness. She was also found to be anemic at that time due to abnormal vaginal bleeding, and she ultimately required a hysterectomy and iron infusions. She also has a strong family history of CAD with MI's in her mother and father. She denies reflux sx, anxiety, or lifting any heavy objects.        Assessment/Plan:      Current Principal Problem:  Chest pain    Chest pain - Initially concerning for ACS but possibly secondary to palpitations possibly anxiety? .  Initial Troponin <6 ----> 18 ---> 10.  EKG NSR 95 bpm.  Follow serial Troponins, reviewed and documented, and monitor on tele - currently w/out evidence of ischemia and/or arrhythmia.   - Stress test ordered and pending  - ECHO ordered and

## 2025-04-08 NOTE — CONSULTS
Consult Placed     Who: CARDIOLOGY, [facility]  Date:4/8/2025  Time:0744     Electronically signed by Jonnie Meier on 4/8/2025 at 7:44 AM

## 2025-04-08 NOTE — CARE COORDINATION
Case Management Assessment  Initial Evaluation    Date/Time of Evaluation: 4/8/2025 12:04 PM  Assessment Completed by: Susanna Ny RN    If patient is discharged prior to next notation, then this note serves as note for discharge by case management.    Patient Name: Tana Cerda                   YOB: 1985  Diagnosis: Sinus tachycardia [R00.0]  Chest pain [R07.9]  Elevated troponin [R79.89]  Near syncope [R55]  Chest pain, unspecified type [R07.9]                   Date / Time: 4/7/2025  3:44 PM    Patient Admission Status: Observation   Readmission Risk (Low < 19, Mod (19-27), High > 27): No data recorded  Current PCP: Jeancarlos Boo MD  PCP verified by ? Yes    Chart Reviewed: Yes      History Provided by: Patient  Patient Orientation: Alert and Oriented    Patient Cognition: Alert    Hospitalization in the last 30 days (Readmission):  No    If yes, Readmission Assessment in  Navigator will be completed.    Advance Directives:      Code Status: Full Code   Patient's Primary Decision Maker is: Legal Next of Kin      Discharge Planning:    Patient lives with: Spouse/Significant Other, Parent Type of Home: House  Primary Care Giver: Self  Patient Support Systems include: Spouse/Significant Other, Family Members   Current Financial resources: Other (Comment) (commercial)  Current community resources: None  Current services prior to admission: None            Current DME:              Type of Home Care services:  None    ADLS  Prior functional level: Independent in ADLs/IADLs  Current functional level: Independent in ADLs/IADLs    PT AM-PAC:   /24  OT AM-PAC:   /24    Family can provide assistance at DC: Yes  Would you like Case Management to discuss the discharge plan with any other family members/significant others, and if so, who? Yes (parents or boy friend)  Plans to Return to Present Housing: Yes  Other Identified Issues/Barriers to RETURNING to current housing:   Potential  Assistance needed at discharge: N/A            Potential DME:    Patient expects to discharge to: House  Plan for transportation at discharge:      Financial    Payor: [place] BCBS / Plan: [place]BS - [place] PPO / Product Type: *No Product type* /     Does insurance require precert for SNF: Yes    Potential assistance Purchasing Medications: No  Meds-to-Beds request:        [facility] [id number] - [place], [place] - [address] - P [phone] - F [phone]  [address] [address]  Phone: [phone] Fax: [phone]      Notes:    Factors facilitating achievement of predicted outcomes: Family support, Cooperative, Pleasant, and Good insight into deficits    Barriers to discharge: none    Additional Case Management Notes: Pt is from home with her parents and her boyfriend. She is legally  from . She is IPTA and denies needs. Will continue to follow course for needs. Susanna Ny RN     The Plan for Transition of Care is related to the following treatment goals of Sinus tachycardia [R00.0]  Chest pain [R07.9]  Elevated troponin [R79.89]  Near syncope [R55]  Chest pain, unspecified type [R07.9]    IF APPLICABLE: The Patient and/or patient representative Tana and her family were provided with a choice of provider and agrees with the discharge plan. Freedom of choice list with basic dialogue that supports the patient's individualized plan of care/goals and shares the quality data associated with the providers was provided to: Patient   Patient Representative Name:       The Patient and/or Patient Representative Agree with the Discharge Plan? Yes    Susanna Ny RN  Case Management Department

## 2025-04-09 ENCOUNTER — APPOINTMENT (OUTPATIENT)
Dept: CT IMAGING | Age: 40
End: 2025-04-09
Payer: COMMERCIAL

## 2025-04-09 VITALS
RESPIRATION RATE: 16 BRPM | SYSTOLIC BLOOD PRESSURE: 104 MMHG | BODY MASS INDEX: 27.11 KG/M2 | DIASTOLIC BLOOD PRESSURE: 71 MMHG | OXYGEN SATURATION: 97 % | WEIGHT: 153 LBS | TEMPERATURE: 98.2 F | HEIGHT: 63 IN | HEART RATE: 63 BPM

## 2025-04-09 LAB
ANION GAP SERPL CALCULATED.3IONS-SCNC: 10 MMOL/L (ref 3–16)
BASOPHILS # BLD: 0 K/UL (ref 0–0.2)
BASOPHILS NFR BLD: 0.4 %
BUN SERPL-MCNC: 10 MG/DL (ref 7–20)
CALCIUM SERPL-MCNC: 8.8 MG/DL (ref 8.3–10.6)
CHLORIDE SERPL-SCNC: 107 MMOL/L (ref 99–110)
CO2 SERPL-SCNC: 23 MMOL/L (ref 21–32)
CREAT SERPL-MCNC: 0.8 MG/DL (ref 0.6–1.1)
DEPRECATED RDW RBC AUTO: 12.7 % (ref 12.4–15.4)
EOSINOPHIL # BLD: 0.2 K/UL (ref 0–0.6)
EOSINOPHIL NFR BLD: 3.6 %
GFR SERPLBLD CREATININE-BSD FMLA CKD-EPI: >90 ML/MIN/{1.73_M2}
GLUCOSE SERPL-MCNC: 107 MG/DL (ref 70–99)
HCT VFR BLD AUTO: 38.1 % (ref 36–48)
HGB BLD-MCNC: 12.8 G/DL (ref 12–16)
LYMPHOCYTES # BLD: 2 K/UL (ref 1–5.1)
LYMPHOCYTES NFR BLD: 36.7 %
MCH RBC QN AUTO: 30.2 PG (ref 26–34)
MCHC RBC AUTO-ENTMCNC: 33.6 G/DL (ref 31–36)
MCV RBC AUTO: 89.7 FL (ref 80–100)
MONOCYTES # BLD: 0.5 K/UL (ref 0–1.3)
MONOCYTES NFR BLD: 8.3 %
NEUTROPHILS # BLD: 2.8 K/UL (ref 1.7–7.7)
NEUTROPHILS NFR BLD: 51 %
PLATELET # BLD AUTO: 223 K/UL (ref 135–450)
PMV BLD AUTO: 9 FL (ref 5–10.5)
POTASSIUM SERPL-SCNC: 3.8 MMOL/L (ref 3.5–5.1)
RBC # BLD AUTO: 4.25 M/UL (ref 4–5.2)
SODIUM SERPL-SCNC: 140 MMOL/L (ref 136–145)
WBC # BLD AUTO: 5.5 K/UL (ref 4–11)

## 2025-04-09 PROCEDURE — 80048 BASIC METABOLIC PNL TOTAL CA: CPT

## 2025-04-09 PROCEDURE — 99232 SBSQ HOSP IP/OBS MODERATE 35: CPT | Performed by: INTERNAL MEDICINE

## 2025-04-09 PROCEDURE — 96374 THER/PROPH/DIAG INJ IV PUSH: CPT

## 2025-04-09 PROCEDURE — G0378 HOSPITAL OBSERVATION PER HR: HCPCS

## 2025-04-09 PROCEDURE — 6370000000 HC RX 637 (ALT 250 FOR IP): Performed by: HOSPITALIST

## 2025-04-09 PROCEDURE — 85025 COMPLETE CBC W/AUTO DIFF WBC: CPT

## 2025-04-09 PROCEDURE — 6370000000 HC RX 637 (ALT 250 FOR IP): Performed by: INTERNAL MEDICINE

## 2025-04-09 PROCEDURE — 96376 TX/PRO/DX INJ SAME DRUG ADON: CPT

## 2025-04-09 PROCEDURE — 75571 CT HRT W/O DYE W/CA TEST: CPT

## 2025-04-09 PROCEDURE — 2500000003 HC RX 250 WO HCPCS: Performed by: INTERNAL MEDICINE

## 2025-04-09 RX ORDER — ASPIRIN 81 MG/1
81 TABLET, CHEWABLE ORAL DAILY
Qty: 30 TABLET | Refills: 0 | Status: SHIPPED | OUTPATIENT
Start: 2025-04-10

## 2025-04-09 RX ORDER — ATORVASTATIN CALCIUM 40 MG/1
40 TABLET, FILM COATED ORAL NIGHTLY
Qty: 30 TABLET | Refills: 0 | Status: SHIPPED | OUTPATIENT
Start: 2025-04-09

## 2025-04-09 RX ORDER — IOPAMIDOL 755 MG/ML
100 INJECTION, SOLUTION INTRAVASCULAR
Status: DISCONTINUED | OUTPATIENT
Start: 2025-04-09 | End: 2025-04-09 | Stop reason: HOSPADM

## 2025-04-09 RX ADMIN — METOPROLOL TARTRATE 50 MG: 50 TABLET, FILM COATED ORAL at 07:49

## 2025-04-09 RX ADMIN — METOPROLOL TARTRATE 5 MG: 5 INJECTION INTRAVENOUS at 10:53

## 2025-04-09 RX ADMIN — METOPROLOL TARTRATE 5 MG: 5 INJECTION INTRAVENOUS at 11:17

## 2025-04-09 RX ADMIN — METOPROLOL TARTRATE 5 MG: 5 INJECTION INTRAVENOUS at 11:09

## 2025-04-09 RX ADMIN — METOPROLOL TARTRATE 5 MG: 5 INJECTION INTRAVENOUS at 10:36

## 2025-04-09 RX ADMIN — ASPIRIN 81 MG: 81 TABLET, CHEWABLE ORAL at 07:31

## 2025-04-09 ASSESSMENT — PAIN SCALES - GENERAL: PAINLEVEL_OUTOF10: 0

## 2025-04-09 NOTE — PLAN OF CARE
Problem: Pain  Goal: Verbalizes/displays adequate comfort level or baseline comfort level  4/8/2025 2234 by Chika Moore RN  Outcome: Progressing  Flowsheets (Taken 4/8/2025 2234)  Verbalizes/displays adequate comfort level or baseline comfort level:   Encourage patient to monitor pain and request assistance   Assess pain using appropriate pain scale   Administer analgesics based on type and severity of pain and evaluate response   Consider cultural and social influences on pain and pain management   Implement non-pharmacological measures as appropriate and evaluate response   Notify Licensed Independent Practitioner if interventions unsuccessful or patient reports new pain   No c/o of pain

## 2025-04-09 NOTE — PROGRESS NOTES
DC instructions given, pt verbalized understanding. Pt aware of f/u appts and RX sent to her pharmacy. Event monitor activated and applied to pt's chest. Pt educated on use. IV removed no complications. Tele box removed returned. Belongings gathered. Awaiting ride.

## 2025-04-09 NOTE — DISCHARGE SUMMARY
Hospital Medicine Discharge Summary    Patient: Tana Cerda   : 1985     Hospital:  [facility]  Admit Date: 2025   Discharge Date:   25  Disposition:  [x]Home   []HHC  []SNF  []Acute Rehab  []LTAC  []Hospice  Code status:  [x]Full  []DNR/CCA  []Limited (DNR/CCA with Do Not Intubate)  []DNRCC  Condition at Discharge: Stable  Primary Care Provider: Jeancarlos Boo MD    Admitting Provider: Nestor Rodriguez MD  Discharge Provider: BRENDAN Odonnell     Discharge Diagnoses:      Active Hospital Problems    Diagnosis     Chest pain [R07.9]        Presenting Admission History:      This is a 39 y.o. female with hx palpitations admitted for left sided CP that started around 3pm while at work. She works at a pre-school.The CP was left-sided, non-radiating, 8/10 scale, \"heaviness\" associated with headache and palpitations. She was checked by one of her colleagues who is a nurse at  and she checked her pulse ox which showed heart rate to 220 and she was brought to ED accompanied by her colleague.  By the time she presented to the ED, her symptoms had resolved. Initial troponin was <6 then 10 then 18. EKG showed NSR.      In 2019, she was seen by cardiology for palpitations and dizziness. She was also found to be anemic at that time due to abnormal vaginal bleeding, and she ultimately required a hysterectomy and iron infusions. She also has a strong family history of CAD with MI's in her mother and father. She denies reflux sx, anxiety, or lifting any heavy objects.     Assessment/Plan:      Chest pain and palpitations - Initially concerning for ACS but possibly secondary to palpitations, unclear etiology.  Initial Troponin <6 ----> 18 ---> 10.  EKG NSR 95 bpm.   - Stress test with low to intermediate risk scan with a small, mild, partially reversible, distal anterior wall/apical perfusion defect noted.  - TTE with normal LV systolic function with EF 55-60%. LV size  Medication List        Current Discharge Medication List          Significant Test Results    CT CARDIAC CALCIUM SCORING  Result Date: 4/9/2025  EXAM: CT CARDIAC CALCIUM SCORE INDICATION: Screening for coronary artery disease COMPARISON: None TECHNIQUE: Helically acquired axial unenhanced ECG-gated images were obtained through the heart for the purposes of coronary artery calcium scoring. Up-to-date CT equipment and radiation dose reduction techniques were employed. FINDINGS: SCAN RANGE: michelle to inferior aspect of heart OVERALL SCORES: *  Agatston calcium score: 0 *  Total volume score: 0 mm3      ARTERY SCORES: *  Left main coronary artery: 0 *  Right coronary artery: 0 *  Left anterior descending coronary artery: 0 *  Circumflex coronary artery: 0 *  Other: 0 *  Posterior descending artery: 0 HEART AND MEDIASTINUM: Heart is normal in size.  No mediastinal adenopathy. LUNGS: Clear. UPPER ABDOMEN: Normal. BONES: No significant abnormality.     1. Calcium score of 0, which implies no identifiable plaque, with a very low, generally less than 5 %, risk of coronary artery disease.  2. No acute abnormality of the chest. Electronically signed by Alec Mckee MD    Echo (TTE) complete (PRN contrast/bubble/strain/3D)  Result Date: 4/8/2025    Image quality is adequate.   Left Ventricle: Normal left ventricular systolic function with a visually estimated EF of 55 - 60%. EF 3D is 58%. Left ventricle size is normal. Normal wall thickness. Normal wall motion. Global longitudinal strain is -19.9%. Normal diastolic function.   Interatrial Septum: Grade I Positive (1 to 9 bubbles). Agitated saline study was positive with provocation. Mild right to left shunt was noted.     Nuclear stress test with myocardial perfusion  Result Date: 4/8/2025  Technically difficult study.  Attenuation correction utilized. A small, mild, partially reversible, distal anterior wall/apical perfusion defect is noted and ischemia cannot be excluded.

## 2025-04-09 NOTE — CARE COORDINATION
CASE MANAGEMENT DISCHARGE SUMMARY      Discharge to: Home    Transportation:    Family/car: personal    Confirmed discharge plan with:     Patient: yes     Family:  yes     RN, name: Laura Ny RN

## 2025-04-09 NOTE — PROGRESS NOTES
Unable to complete CTA cardiac due to patients heart rate remaining above 60, despite multiple doses of Metoprolol. 4 doses given per orders.    Unable to give Nitroglycerin due to systolic BP <100 per order parameters.   Primary RN aware.

## 2025-04-09 NOTE — DISCHARGE INSTRUCTIONS
Please follow up with Dr. Renteria as outpatient. We recommend that you discuss with him whether to continue on aspirin and atorvastatin.    FMLA form can be faxed to [phone]

## 2025-04-09 NOTE — PROGRESS NOTES
CARDIOLOGY CONSULTATION        Patient Name: Tana Cerda  Date of admission: 2025  3:44 PM  Admission Dx: Sinus tachycardia [R00.0]  Chest pain [R07.9]  Elevated troponin [R79.89]  Near syncope [R55]  Chest pain, unspecified type [R07.9]  Requesting Physician: Nestor Rodriguez MD  Primary Care physician: Jeancarlos Boo MD    Reason for Consultation/Chief Complaint: palpitations, chest pain    History of Present Illness:     Tana Cerda is a 39 y.o. woman admitted for palpitations and chest pain.  Family member/significant other at bedside.  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  She experienced sudden onset of dizziness and palpitations and had to be helped by one of her colleagues.  A friend who is a nurse placed a pulse ox on her finger and noted elevated heart rates >220 bpm, so she presented to ED for further evaluation.  Patient reports significant family history of CAD and stroke in her father, mother, and 38 year old sister.  Of note, patient also with history of significant anemia that required hysterectomy due to abnormal vaginal bleeding.  Currently patient reports she is chest pain free and completed cardiac testing earlier this morning.  Cardiology consulted to assist with management.     -- Stable overnight with no acute events.  Unable to complete CCTA due to elevated heart rates.  Calcium score completed and normal.    Past Medical History:   has a past medical history of Acne, Adjustment reaction with anxiety and depression, Allergic rhinitis, Anxiety, Dysmenorrhea, Latex allergy, and RAD (reactive airway disease).    Surgical History:   has a past surgical history that includes pre-malignant / benign skin lesion excision (,); [place] tooth extraction (Bilateral, age 18);  section, low transverse (2014);  section (02/15/2016); Tubal ligation (Bilateral, 02/15/2016); and Hysterectomy (2019).  04/09/2025: IMPRESSION:   1. Calcium score of 0, which implies no identifiable plaque, with a very low, generally less than 5 %, risk of coronary artery disease.    2. No acute abnormality of the chest.    CT Chest 04/07/2025: IMPRESSION:   1.  Normal CT chest without evidence for PE.    Impression and Plan:      Palpitations  Chest pain  Dizziness  Family history of CAD  THC use    --Echo and nuclear stress test findings reviewed, CCTA unable to be completed at this time, CAC score was normal; continue med mgmt  --Place 2 week event monitor upon discharge  --Keep electrolytes replete    Ok to DC home from a cardiology standpoint with clinic FU at Dr Renteria's office with [facility] in 2-4 weeks.    I will address the patient's cardiac risk factors and adjusted pharmacologic treatment as needed. In addition, I have reinforced the need for patient directed risk factor modification.  All questions and concerns were addressed to the patient/family. Alternatives to my treatment were discussed.     Thank you for allowing us to participate in the care of Tana Cerda. Please call me with any questions [phone].    Rafa Leiva MD [facility]  Cardiovascular Disease  [facility]  [phone] [place] Office  [phone] [place] Office  4/9/2025 9:34 AM

## 2025-04-10 ENCOUNTER — TELEPHONE (OUTPATIENT)
Dept: CARDIOLOGY CLINIC | Age: 40
End: 2025-04-10

## 2025-04-10 NOTE — TELEPHONE ENCOUNTER
Pt saw gui while in the hospital on 4/8/25. She was discharged with 2 week event monitor. She will need a return to work letter. 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 Please advise.

## 2025-04-29 LAB — ECHO BSA: 1.78 M2

## 2025-04-30 ENCOUNTER — RESULTS FOLLOW-UP (OUTPATIENT)
Dept: CARDIOLOGY CLINIC | Age: 40
End: 2025-04-30

## 2025-05-02 ENCOUNTER — OFFICE VISIT (OUTPATIENT)
Dept: CARDIOLOGY CLINIC | Age: 40
End: 2025-05-02
Payer: COMMERCIAL

## 2025-05-02 VITALS
WEIGHT: 158 LBS | SYSTOLIC BLOOD PRESSURE: 108 MMHG | BODY MASS INDEX: 28 KG/M2 | OXYGEN SATURATION: 99 % | HEART RATE: 85 BPM | HEIGHT: 63 IN | DIASTOLIC BLOOD PRESSURE: 64 MMHG

## 2025-05-02 DIAGNOSIS — R55 NEAR SYNCOPE: Primary | ICD-10-CM

## 2025-05-02 DIAGNOSIS — R00.2 PALPITATIONS: ICD-10-CM

## 2025-05-02 DIAGNOSIS — Z82.49 FAMILY HISTORY OF EARLY CAD: ICD-10-CM

## 2025-05-02 DIAGNOSIS — R00.0 SINUS TACHYCARDIA: ICD-10-CM

## 2025-05-02 DIAGNOSIS — R07.89 ATYPICAL CHEST PAIN: ICD-10-CM

## 2025-05-02 PROCEDURE — G2211 COMPLEX E/M VISIT ADD ON: HCPCS | Performed by: INTERNAL MEDICINE

## 2025-05-02 PROCEDURE — 99214 OFFICE O/P EST MOD 30 MIN: CPT | Performed by: INTERNAL MEDICINE

## 2025-05-02 RX ORDER — NITROGLYCERIN 0.4 MG/1
0.8 TABLET SUBLINGUAL
OUTPATIENT
Start: 2025-05-02

## 2025-05-02 RX ORDER — SODIUM CHLORIDE 0.9 % (FLUSH) 0.9 %
5-40 SYRINGE (ML) INJECTION EVERY 12 HOURS SCHEDULED
OUTPATIENT
Start: 2025-05-02

## 2025-05-02 RX ORDER — NITROGLYCERIN 0.4 MG/1
0.4 TABLET SUBLINGUAL
OUTPATIENT
Start: 2025-05-02

## 2025-05-02 RX ORDER — ATORVASTATIN CALCIUM 40 MG/1
40 TABLET, FILM COATED ORAL NIGHTLY
Qty: 90 TABLET | Refills: 3 | Status: SHIPPED | OUTPATIENT
Start: 2025-05-02

## 2025-05-02 RX ORDER — METOPROLOL TARTRATE 1 MG/ML
5 INJECTION, SOLUTION INTRAVENOUS EVERY 5 MIN PRN
OUTPATIENT
Start: 2025-05-02

## 2025-05-02 RX ORDER — METOPROLOL TARTRATE 50 MG
TABLET ORAL
Qty: 3 TABLET | Refills: 0 | Status: SHIPPED | OUTPATIENT
Start: 2025-05-02

## 2025-05-02 RX ORDER — SODIUM CHLORIDE 9 MG/ML
INJECTION, SOLUTION INTRAVENOUS PRN
OUTPATIENT
Start: 2025-05-02

## 2025-05-02 RX ORDER — SODIUM CHLORIDE 0.9 % (FLUSH) 0.9 %
5-40 SYRINGE (ML) INJECTION PRN
OUTPATIENT
Start: 2025-05-02

## 2025-05-02 RX ORDER — ASPIRIN 81 MG/1
81 TABLET, CHEWABLE ORAL DAILY
Qty: 90 TABLET | Refills: 3 | Status: SHIPPED | OUTPATIENT
Start: 2025-05-02

## 2025-05-02 NOTE — PROGRESS NOTES
CARDIOLOGY OFFICE VISIT      Patient Name: Tana Cerda  Primary Care physician: Jeancarlos Boo MD    Reason for Referral/Chief Complaint: Tana Cerda is a 39 y.o. patient who is referred to cardiology clinic today for evaluation and treatment of palpitations and chest pain.     History of Present Illness:   Tana Cerda is a 39 y.o. woman who was admitted 27 for palpitations and chest pain.  Family member/significant other at bedside.  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  She experienced sudden onset of dizziness and palpitations and had to be helped by one of her colleagues.  A friend who is a nurse placed a pulse ox on her finger and noted elevated heart rates >220 bpm, so she presented to ED for further evaluation.  Patient reported significant family history of CAD and stroke in her father, mother, and 38 year old sister.  Of note, patient also with history of significant anemia that required hysterectomy due to abnormal vaginal bleeding.    Today, 27, she states she is doing well. She reports she continues to experience intermittent episodes of dizziness, palpitations and chest pain. She reports there is no precipitating symptoms. Patient denies current edema, shortness of breath, palpitations, or syncope.   Patient is taking all cardiac medications as prescribed and tolerates them well.  She drinks about 100 oz of water daily.     Past Medical History:   has a past medical history of Acne, Adjustment reaction with anxiety and depression, Allergic rhinitis, Anxiety, Dysmenorrhea, Latex allergy, and RAD (reactive airway disease).    Surgical History:   has a past surgical history that includes pre-malignant / benign skin lesion excision (,); [place] tooth extraction (Bilateral, age 18);  section, low transverse (2014);  section (02/15/2016); Tubal ligation (Bilateral, 02/15/2016); and Hysterectomy (2019).     Social

## 2025-05-02 NOTE — PATIENT INSTRUCTIONS
Plan:  Order Cardiac CTA which is a noninvasive test to visualize your coronary arteries.   Lopressor 50 mg (Metoprolol tartrate) was sent to your pharmacy, this medication is used to lower your heart rate to get adequate pictures of your heart.  Two hours before your cardiac CTA check your heart rate.If your heart rate is less than 55 bpm do not take any tablets. If your heart rate is 55-70 bpm take 1 tablet. If your heart rate is 71-80 bpm take 2 tablets. If your heart rate is greater than 80 bpm take 3 tablets.   You should be NPO (nothing to eat or drink) 3-4 hours prior to test.   No caffeine for 24 hours prior.   Take all other regularly scheduled medications as normal.    Follow up with me in 6-8 weeks.     Your provider has ordered testing for further evaluation.  An order/prescription has been included in your paper work.   To schedule outpatient testing, contact Central Scheduling by calling [address] [place], [place] [address]ELLE ([phone]).

## 2025-06-16 ENCOUNTER — TELEPHONE (OUTPATIENT)
Dept: INTERVENTIONAL RADIOLOGY/VASCULAR | Age: 40
End: 2025-06-16

## 2025-06-16 RX ORDER — METOPROLOL TARTRATE 50 MG
TABLET ORAL
Qty: 3 TABLET | Refills: 0 | Status: SHIPPED | OUTPATIENT
Start: 2025-06-16

## 2025-06-16 NOTE — TELEPHONE ENCOUNTER
Called and spoke to Tana about upcoming procedure. Phone number used: [phone]  Procedure: Cardiac CTA  Approving Radiologist: N/A    Pt informed of the following:  Date of procedure: 6/16/2025  Arrival time of procedure: 1230  Time of procedure: 1330

## 2025-06-16 NOTE — TELEPHONE ENCOUNTER
Pt can not find the Metoprolol she picked up for her CTA. Pt needs this called in again to [facility] in [facility]. CTA is tomorrow at 1:30.

## 2025-06-17 ENCOUNTER — HOSPITAL ENCOUNTER (OUTPATIENT)
Dept: CT IMAGING | Age: 40
Discharge: HOME | End: 2025-06-17
Attending: INTERNAL MEDICINE
Payer: COMMERCIAL

## 2025-06-17 ENCOUNTER — TELEPHONE (OUTPATIENT)
Dept: CARDIOLOGY CLINIC | Age: 40
End: 2025-06-17

## 2025-06-17 VITALS
DIASTOLIC BLOOD PRESSURE: 67 MMHG | SYSTOLIC BLOOD PRESSURE: 100 MMHG | HEART RATE: 74 BPM | OXYGEN SATURATION: 100 % | RESPIRATION RATE: 18 BRPM

## 2025-06-17 DIAGNOSIS — R07.89 ATYPICAL CHEST PAIN: ICD-10-CM

## 2025-06-17 DIAGNOSIS — R00.0 SINUS TACHYCARDIA: ICD-10-CM

## 2025-06-17 DIAGNOSIS — R55 NEAR SYNCOPE: ICD-10-CM

## 2025-06-17 DIAGNOSIS — Z82.49 FAMILY HISTORY OF EARLY CAD: ICD-10-CM

## 2025-06-17 DIAGNOSIS — R00.2 PALPITATIONS: ICD-10-CM

## 2025-06-17 PROCEDURE — 2500000003 HC RX 250 WO HCPCS: Performed by: INTERNAL MEDICINE

## 2025-06-17 RX ORDER — SODIUM CHLORIDE 0.9 % (FLUSH) 0.9 %
5-40 SYRINGE (ML) INJECTION PRN
Status: DISCONTINUED | OUTPATIENT
Start: 2025-06-17 | End: 2025-06-18 | Stop reason: HOSPADM

## 2025-06-17 RX ORDER — METOPROLOL TARTRATE 1 MG/ML
5 INJECTION, SOLUTION INTRAVENOUS EVERY 5 MIN PRN
Status: COMPLETED | OUTPATIENT
Start: 2025-06-17 | End: 2025-06-17

## 2025-06-17 RX ORDER — SODIUM CHLORIDE 9 MG/ML
INJECTION, SOLUTION INTRAVENOUS PRN
Status: DISCONTINUED | OUTPATIENT
Start: 2025-06-17 | End: 2025-06-18 | Stop reason: HOSPADM

## 2025-06-17 RX ORDER — NITROGLYCERIN 0.4 MG/1
0.4 TABLET SUBLINGUAL
Status: DISCONTINUED | OUTPATIENT
Start: 2025-06-17 | End: 2025-06-18 | Stop reason: HOSPADM

## 2025-06-17 RX ORDER — NITROGLYCERIN 0.4 MG/1
0.8 TABLET SUBLINGUAL
Status: DISCONTINUED | OUTPATIENT
Start: 2025-06-17 | End: 2025-06-18 | Stop reason: HOSPADM

## 2025-06-17 RX ORDER — SODIUM CHLORIDE 0.9 % (FLUSH) 0.9 %
5-40 SYRINGE (ML) INJECTION EVERY 12 HOURS SCHEDULED
Status: DISCONTINUED | OUTPATIENT
Start: 2025-06-17 | End: 2025-06-18 | Stop reason: HOSPADM

## 2025-06-17 RX ADMIN — METOPROLOL TARTRATE 5 MG: 1 INJECTION, SOLUTION INTRAVENOUS at 13:23

## 2025-06-17 RX ADMIN — METOPROLOL TARTRATE 5 MG: 1 INJECTION, SOLUTION INTRAVENOUS at 13:12

## 2025-06-17 RX ADMIN — METOPROLOL TARTRATE 5 MG: 1 INJECTION, SOLUTION INTRAVENOUS at 13:07

## 2025-06-17 RX ADMIN — METOPROLOL TARTRATE 5 MG: 1 INJECTION, SOLUTION INTRAVENOUS at 13:17

## 2025-06-17 NOTE — PROGRESS NOTES
Talked to cardiologist office about canceling patients CCTA due to heart rate being around 75 after 4 doses of metoprolol.

## 2025-06-17 NOTE — PROGRESS NOTES
Patient arrived for Coronary CTA.     History and medications were reviewed with patient. Patient denies questions or concerns at this time.     Latex, Sulfamethoxazole-trimethoprim, and Sulfamethoxazole-trimethoprim    Past Medical History:   Diagnosis Date    Acne     Adjustment reaction with anxiety and depression     PMDD    Allergic rhinitis 8/07: IgE labs    dustmite, mold, walnut tree    Anxiety     Dysmenorrhea     Latex allergy     RAD (reactive airway disease)     6/06: Vit. D Insufficiency       Prior to Admission medications    Medication Sig Start Date End Date Taking? Authorizing Provider   metoprolol tartrate (LOPRESSOR) 50 MG tablet Two hours before your CTA test check your heart rate, take by mouth 1 tablet if your heart rate is 55-70, 2 tablets if your heart rate is 71-80, 3 tablets if heart rate greater than 80, and no tablets if heart rate is less than 55. 6/16/25   Rafa Leiva MD   aspirin 81 MG chewable tablet Take 1 tablet by mouth daily 5/2/25   Rafa Leiva MD   atorvastatin (LIPITOR) 40 MG tablet Take 1 tablet by mouth nightly 5/2/25   Rafa Leiva MD       There were no vitals filed for this visit.    Patient took 0mg Metoprolol  last night: No   Patient took 150 mg of Metoprolol this morning: Yes    IV placed: 20G to LAC    Current heart rhythm: normal sinus

## 2025-06-20 ENCOUNTER — OFFICE VISIT (OUTPATIENT)
Dept: CARDIOLOGY CLINIC | Age: 40
End: 2025-06-20
Payer: COMMERCIAL

## 2025-06-20 VITALS
OXYGEN SATURATION: 98 % | WEIGHT: 158 LBS | SYSTOLIC BLOOD PRESSURE: 110 MMHG | BODY MASS INDEX: 28 KG/M2 | HEIGHT: 63 IN | DIASTOLIC BLOOD PRESSURE: 72 MMHG | HEART RATE: 104 BPM

## 2025-06-20 DIAGNOSIS — R00.2 PALPITATIONS: Primary | ICD-10-CM

## 2025-06-20 DIAGNOSIS — R00.0 SINUS TACHYCARDIA: ICD-10-CM

## 2025-06-20 DIAGNOSIS — R55 NEAR SYNCOPE: ICD-10-CM

## 2025-06-20 DIAGNOSIS — Z82.49 FAMILY HISTORY OF EARLY CAD: ICD-10-CM

## 2025-06-20 DIAGNOSIS — R07.89 ATYPICAL CHEST PAIN: ICD-10-CM

## 2025-06-20 PROCEDURE — 99214 OFFICE O/P EST MOD 30 MIN: CPT | Performed by: INTERNAL MEDICINE

## 2025-06-20 PROCEDURE — G2211 COMPLEX E/M VISIT ADD ON: HCPCS | Performed by: INTERNAL MEDICINE

## 2025-06-20 RX ORDER — DILTIAZEM HYDROCHLORIDE 120 MG/1
120 CAPSULE, COATED, EXTENDED RELEASE ORAL NIGHTLY
Qty: 30 CAPSULE | Refills: 3 | Status: SHIPPED | OUTPATIENT
Start: 2025-06-20

## 2025-06-20 RX ORDER — METOPROLOL TARTRATE 1 MG/ML
5 INJECTION, SOLUTION INTRAVENOUS EVERY 5 MIN PRN
OUTPATIENT
Start: 2025-06-20

## 2025-06-20 RX ORDER — NITROGLYCERIN 0.4 MG/1
0.8 TABLET SUBLINGUAL
OUTPATIENT
Start: 2025-06-20

## 2025-06-20 RX ORDER — NITROGLYCERIN 0.4 MG/1
0.4 TABLET SUBLINGUAL
OUTPATIENT
Start: 2025-06-20

## 2025-06-20 RX ORDER — SODIUM CHLORIDE 9 MG/ML
INJECTION, SOLUTION INTRAVENOUS PRN
OUTPATIENT
Start: 2025-06-20

## 2025-06-20 RX ORDER — SODIUM CHLORIDE 0.9 % (FLUSH) 0.9 %
5-40 SYRINGE (ML) INJECTION PRN
OUTPATIENT
Start: 2025-06-20

## 2025-06-20 RX ORDER — METOPROLOL TARTRATE 50 MG
TABLET ORAL
Qty: 3 TABLET | Refills: 0 | Status: SHIPPED | OUTPATIENT
Start: 2025-06-20

## 2025-06-20 RX ORDER — ALBUTEROL SULFATE 90 UG/1
2 INHALANT RESPIRATORY (INHALATION) PRN
COMMUNITY

## 2025-06-20 RX ORDER — SODIUM CHLORIDE 0.9 % (FLUSH) 0.9 %
5-40 SYRINGE (ML) INJECTION EVERY 12 HOURS SCHEDULED
OUTPATIENT
Start: 2025-06-20

## 2025-06-20 NOTE — PATIENT INSTRUCTIONS
Plan:  Start diltiazem (Cardizem) 120 mg nightly.   Order Cardiac CTA which is a noninvasive test to visualize your coronary arteries.   Lopressor 50 mg (Metoprolol tartrate) was sent to your pharmacy, this medication is used to lower your heart rate to get adequate pictures of your heart.  Two hours before your cardiac CTA check your heart rate.If your heart rate is less than 55 bpm do not take any tablets. If your heart rate is 55-70 bpm take 1 tablet. If your heart rate is 71-80 bpm take 2 tablets. If your heart rate is greater than 80 bpm take 3 tablets.   You should be NPO (nothing to eat or drink) 3-4 hours prior to test.   No caffeine for 24 hours prior.  Take all other regularly scheduled medications as normal.    The day of Cardiac CTA take an additional diltiazem (Cardizem) 120 mg 2 hours before test.   Follow up with me in 3 months.

## 2025-07-14 RX ORDER — DILTIAZEM HYDROCHLORIDE 120 MG/1
120 CAPSULE, COATED, EXTENDED RELEASE ORAL NIGHTLY
Qty: 90 CAPSULE | Refills: 2 | OUTPATIENT
Start: 2025-07-14

## 2025-07-21 RX ORDER — DILTIAZEM HYDROCHLORIDE 120 MG/1
120 CAPSULE, COATED, EXTENDED RELEASE ORAL NIGHTLY
Qty: 30 CAPSULE | Refills: 3 | OUTPATIENT
Start: 2025-07-21